# Patient Record
Sex: FEMALE | Race: OTHER | HISPANIC OR LATINO | URBAN - METROPOLITAN AREA
[De-identification: names, ages, dates, MRNs, and addresses within clinical notes are randomized per-mention and may not be internally consistent; named-entity substitution may affect disease eponyms.]

---

## 2018-03-01 ENCOUNTER — OUTPATIENT (OUTPATIENT)
Dept: OUTPATIENT SERVICES | Facility: HOSPITAL | Age: 38
LOS: 1 days | End: 2018-03-01
Payer: MEDICAID

## 2018-03-01 DIAGNOSIS — Z98.89 OTHER SPECIFIED POSTPROCEDURAL STATES: Chronic | ICD-10-CM

## 2018-03-01 PROCEDURE — G9001: CPT

## 2018-03-12 ENCOUNTER — EMERGENCY (EMERGENCY)
Facility: HOSPITAL | Age: 38
LOS: 1 days | Discharge: HOME | End: 2018-03-12
Attending: EMERGENCY MEDICINE
Payer: MEDICAID

## 2018-03-12 VITALS
RESPIRATION RATE: 18 BRPM | DIASTOLIC BLOOD PRESSURE: 83 MMHG | TEMPERATURE: 98 F | HEART RATE: 83 BPM | OXYGEN SATURATION: 97 % | SYSTOLIC BLOOD PRESSURE: 127 MMHG

## 2018-03-12 DIAGNOSIS — Z98.89 OTHER SPECIFIED POSTPROCEDURAL STATES: Chronic | ICD-10-CM

## 2018-03-12 LAB
APPEARANCE UR: CLEAR — SIGNIFICANT CHANGE UP
BACTERIA # UR AUTO: ABNORMAL /HPF
BILIRUB UR-MCNC: NEGATIVE — SIGNIFICANT CHANGE UP
COLOR SPEC: YELLOW — SIGNIFICANT CHANGE UP
DIFF PNL FLD: ABNORMAL
EPI CELLS # UR: SIGNIFICANT CHANGE UP /HPF
GLUCOSE UR QL: NEGATIVE — SIGNIFICANT CHANGE UP
HCG UR QL: NEGATIVE — SIGNIFICANT CHANGE UP
KETONES UR-MCNC: NEGATIVE — SIGNIFICANT CHANGE UP
LEUKOCYTE ESTERASE UR-ACNC: NEGATIVE — SIGNIFICANT CHANGE UP
NITRITE UR-MCNC: NEGATIVE — SIGNIFICANT CHANGE UP
PH UR: 5 — SIGNIFICANT CHANGE UP (ref 5–8)
PROT UR-MCNC: 15
RBC CASTS # UR COMP ASSIST: ABNORMAL /HPF (ref 0–2)
SP GR SPEC: 1.02 — SIGNIFICANT CHANGE UP (ref 1.01–1.02)
UROBILINOGEN FLD QL: NEGATIVE — SIGNIFICANT CHANGE UP
WBC UR QL: SIGNIFICANT CHANGE UP /HPF (ref 0–5)

## 2018-03-12 PROCEDURE — 72148 MRI LUMBAR SPINE W/O DYE: CPT | Mod: 26

## 2018-03-12 PROCEDURE — 99284 EMERGENCY DEPT VISIT MOD MDM: CPT

## 2018-03-12 PROCEDURE — 81025 URINE PREGNANCY TEST: CPT

## 2018-03-12 PROCEDURE — 96372 THER/PROPH/DIAG INJ SC/IM: CPT

## 2018-03-12 PROCEDURE — 72148 MRI LUMBAR SPINE W/O DYE: CPT

## 2018-03-12 PROCEDURE — 81001 URINALYSIS AUTO W/SCOPE: CPT

## 2018-03-12 PROCEDURE — 99284 EMERGENCY DEPT VISIT MOD MDM: CPT | Mod: 25

## 2018-03-12 RX ORDER — IBUPROFEN 200 MG
1 TABLET ORAL
Qty: 20 | Refills: 0
Start: 2018-03-12 | End: 2018-03-16

## 2018-03-12 RX ORDER — OXYCODONE AND ACETAMINOPHEN 5; 325 MG/1; MG/1
1 TABLET ORAL ONCE
Qty: 0 | Refills: 0 | Status: DISCONTINUED | OUTPATIENT
Start: 2018-03-12 | End: 2018-03-12

## 2018-03-12 RX ORDER — METHOCARBAMOL 500 MG/1
2 TABLET, FILM COATED ORAL
Qty: 24 | Refills: 0
Start: 2018-03-12 | End: 2018-03-15

## 2018-03-12 RX ORDER — KETOROLAC TROMETHAMINE 30 MG/ML
60 SYRINGE (ML) INJECTION ONCE
Qty: 0 | Refills: 0 | Status: DISCONTINUED | OUTPATIENT
Start: 2018-03-12 | End: 2018-03-12

## 2018-03-12 RX ORDER — OXYCODONE HYDROCHLORIDE 5 MG/1
1 TABLET ORAL
Qty: 9 | Refills: 0
Start: 2018-03-12 | End: 2018-03-14

## 2018-03-12 RX ADMIN — OXYCODONE AND ACETAMINOPHEN 1 TABLET(S): 5; 325 TABLET ORAL at 17:52

## 2018-03-12 RX ADMIN — Medication 60 MILLIGRAM(S): at 17:52

## 2018-03-12 NOTE — ED PROVIDER NOTE - OBJECTIVE STATEMENT
37 y/o F pt w/ a PMHx of HTN presents to the ED c/o lower back pain x days. Pain is left-sided, radiates down the L sided. Intense worsening of sx since yesterday morning. Pt denies increased exertion. Reports urinary incontinence-- states that she has not been able to get to the bathroom to urinate in time since yesterday. Pt has had multiple episodes of urinary incontinence yesterday and today. No prior hx of back pain or sciatica. Pt took Tylenol w/ no relief last night. LNMP was February 12th. Denies numbness, weakness, and any other complaints. NKDA.

## 2018-03-12 NOTE — ED ADULT NURSE NOTE - OBJECTIVE STATEMENT
pt is a 37 y/o female with c/o abdominal pain started yesterday unable to  eat nor drink food. abdomen soft non distendeD + BS noted pt is a 39 y/o female with c/o back pain  started yesterday  denies any injury  and left side radiates going down to her left sided  since yesterday back pain increases thru exertion.

## 2018-03-12 NOTE — ED PROVIDER NOTE - NEUROLOGICAL, MLM
Alert and oriented, no focal deficits, no motor or sensory deficits. NV intact. No saddle paresthesia.

## 2018-03-12 NOTE — ED ADULT NURSE NOTE - LOCATION
[facility] [address] of Child Health Examination       Student's Name  Nehalse Beck Bernarda Da Title                           Date     Signature HEALTH HISTORY          TO BE COMPLETED AND SIGNED BY PARENT/GUARDIAN AND VERIFIED BY HEALTH CARE PROVIDER    ALLERGIES  (Food, drug, insect, other)  Patient has no known allergies.  MEDICATION  (List all prescribed or taken on a regular basis.)    Current Bone/Joint problem/injury/scoliosis?    Yes   No  Parent/Guardian Signature                                          Date     PHYSICAL EXAMINATION REQUIREMENTS    Entire section below to be completed by MD/DO/APN/PA       PHYSICAL EXAMINATION REQUIREMENTS ( Eyes Yes     Screen result:   Genito-Urinary Yes  LMP   Nose Yes  Neurological Yes    Throat Yes  Musculoskeletal Yes    Mouth/Dental Yes  Spinal examination Yes    Cardiovascular/HTN Yes  Nutritional status Yes    Respiratory Yes                   Diagnos Printed by the [employer] abdomen

## 2018-03-12 NOTE — ED PROVIDER NOTE - MEDICAL DECISION MAKING DETAILS
Pt w/ lower back pain and urinary incontinence. New onset sciatica. Significant sx. Difficulty ambulating and intermittent urinary incontinence. Will get emergent MRI to r/o Cauda equina or cord compression. Reassess.

## 2018-03-12 NOTE — ED PROVIDER NOTE - PROGRESS NOTE DETAILS
NP NOTE: Pt seen by intake physician and HPI/ROS/PE/MDM reviewed. Pt seen and evaluated. Discussed plan and any resulted studies at this time. Will continue to monitor and re-evaluate.  Re-Evaluation: pt feeling slightly improved. will dc with spine fu. Nemes - MRI neg, pt feels better, will DC w Spine f/u

## 2018-03-16 DIAGNOSIS — R69 ILLNESS, UNSPECIFIED: ICD-10-CM

## 2018-03-26 ENCOUNTER — APPOINTMENT (OUTPATIENT)
Dept: ORTHOPEDIC SURGERY | Facility: CLINIC | Age: 38
End: 2018-03-26
Payer: MEDICAID

## 2018-03-26 ENCOUNTER — TRANSCRIPTION ENCOUNTER (OUTPATIENT)
Age: 38
End: 2018-03-26

## 2018-03-26 VITALS — WEIGHT: 270 LBS | HEIGHT: 65 IN | BODY MASS INDEX: 44.98 KG/M2

## 2018-03-26 DIAGNOSIS — Z87.39 PERSONAL HISTORY OF OTHER DISEASES OF THE MUSCULOSKELETAL SYSTEM AND CONNECTIVE TISSUE: ICD-10-CM

## 2018-03-26 DIAGNOSIS — M51.36 OTHER INTERVERTEBRAL DISC DEGENERATION, LUMBAR REGION: ICD-10-CM

## 2018-03-26 DIAGNOSIS — Z78.9 OTHER SPECIFIED HEALTH STATUS: ICD-10-CM

## 2018-03-26 PROCEDURE — 99203 OFFICE O/P NEW LOW 30 MIN: CPT

## 2018-03-26 RX ORDER — DICLOFENAC SODIUM 75 MG/1
75 TABLET, DELAYED RELEASE ORAL
Qty: 40 | Refills: 0 | Status: DISCONTINUED | COMMUNITY
Start: 2018-03-26 | End: 2018-03-26

## 2018-03-28 ENCOUNTER — CHART COPY (OUTPATIENT)
Age: 38
End: 2018-03-28

## 2018-11-13 ENCOUNTER — APPOINTMENT (OUTPATIENT)
Dept: INTERNAL MEDICINE | Facility: CLINIC | Age: 38
End: 2018-11-13

## 2019-09-01 ENCOUNTER — OUTPATIENT (OUTPATIENT)
Dept: OUTPATIENT SERVICES | Facility: HOSPITAL | Age: 39
LOS: 1 days | End: 2019-09-01
Payer: MEDICAID

## 2019-09-01 DIAGNOSIS — Z98.89 OTHER SPECIFIED POSTPROCEDURAL STATES: Chronic | ICD-10-CM

## 2019-09-01 PROCEDURE — G9001: CPT

## 2019-09-16 ENCOUNTER — EMERGENCY (EMERGENCY)
Facility: HOSPITAL | Age: 39
LOS: 1 days | Discharge: HOME | End: 2019-09-16
Attending: EMERGENCY MEDICINE
Payer: MEDICAID

## 2019-09-16 VITALS
RESPIRATION RATE: 16 BRPM | DIASTOLIC BLOOD PRESSURE: 86 MMHG | HEART RATE: 92 BPM | OXYGEN SATURATION: 98 % | HEIGHT: 66 IN | TEMPERATURE: 98 F | WEIGHT: 270.07 LBS | SYSTOLIC BLOOD PRESSURE: 133 MMHG

## 2019-09-16 DIAGNOSIS — Z98.89 OTHER SPECIFIED POSTPROCEDURAL STATES: Chronic | ICD-10-CM

## 2019-09-16 LAB
ALBUMIN SERPL ELPH-MCNC: 3.1 G/DL — LOW (ref 3.5–5)
ALP SERPL-CCNC: 70 U/L — SIGNIFICANT CHANGE UP (ref 40–120)
ALT FLD-CCNC: 34 U/L DA — SIGNIFICANT CHANGE UP (ref 10–60)
ANION GAP SERPL CALC-SCNC: 5 MMOL/L — SIGNIFICANT CHANGE UP (ref 5–17)
APTT BLD: 31.2 SEC — SIGNIFICANT CHANGE UP (ref 27.5–36.3)
AST SERPL-CCNC: 18 U/L — SIGNIFICANT CHANGE UP (ref 10–40)
BASOPHILS # BLD AUTO: 0.03 K/UL — SIGNIFICANT CHANGE UP (ref 0–0.2)
BASOPHILS NFR BLD AUTO: 0.3 % — SIGNIFICANT CHANGE UP (ref 0–2)
BILIRUB SERPL-MCNC: 0.3 MG/DL — SIGNIFICANT CHANGE UP (ref 0.2–1.2)
BUN SERPL-MCNC: 11 MG/DL — SIGNIFICANT CHANGE UP (ref 7–18)
CALCIUM SERPL-MCNC: 8.7 MG/DL — SIGNIFICANT CHANGE UP (ref 8.4–10.5)
CHLORIDE SERPL-SCNC: 107 MMOL/L — SIGNIFICANT CHANGE UP (ref 96–108)
CO2 SERPL-SCNC: 28 MMOL/L — SIGNIFICANT CHANGE UP (ref 22–31)
CREAT SERPL-MCNC: 0.61 MG/DL — SIGNIFICANT CHANGE UP (ref 0.5–1.3)
D DIMER BLD IA.RAPID-MCNC: 274 NG/ML DDU — HIGH
EOSINOPHIL # BLD AUTO: 0.14 K/UL — SIGNIFICANT CHANGE UP (ref 0–0.5)
EOSINOPHIL NFR BLD AUTO: 1.4 % — SIGNIFICANT CHANGE UP (ref 0–6)
GLUCOSE SERPL-MCNC: 106 MG/DL — HIGH (ref 70–99)
HCT VFR BLD CALC: 36.1 % — SIGNIFICANT CHANGE UP (ref 34.5–45)
HGB BLD-MCNC: 11.6 G/DL — SIGNIFICANT CHANGE UP (ref 11.5–15.5)
IMM GRANULOCYTES NFR BLD AUTO: 0.2 % — SIGNIFICANT CHANGE UP (ref 0–1.5)
INR BLD: 1.18 RATIO — HIGH (ref 0.88–1.16)
LYMPHOCYTES # BLD AUTO: 2.07 K/UL — SIGNIFICANT CHANGE UP (ref 1–3.3)
LYMPHOCYTES # BLD AUTO: 20.6 % — SIGNIFICANT CHANGE UP (ref 13–44)
MCHC RBC-ENTMCNC: 26.7 PG — LOW (ref 27–34)
MCHC RBC-ENTMCNC: 32.1 GM/DL — SIGNIFICANT CHANGE UP (ref 32–36)
MCV RBC AUTO: 83 FL — SIGNIFICANT CHANGE UP (ref 80–100)
MONOCYTES # BLD AUTO: 1.04 K/UL — HIGH (ref 0–0.9)
MONOCYTES NFR BLD AUTO: 10.3 % — SIGNIFICANT CHANGE UP (ref 2–14)
NEUTROPHILS # BLD AUTO: 6.76 K/UL — SIGNIFICANT CHANGE UP (ref 1.8–7.4)
NEUTROPHILS NFR BLD AUTO: 67.2 % — SIGNIFICANT CHANGE UP (ref 43–77)
NRBC # BLD: 0 /100 WBCS — SIGNIFICANT CHANGE UP (ref 0–0)
PLATELET # BLD AUTO: 184 K/UL — SIGNIFICANT CHANGE UP (ref 150–400)
POTASSIUM SERPL-MCNC: 3.4 MMOL/L — LOW (ref 3.5–5.3)
POTASSIUM SERPL-SCNC: 3.4 MMOL/L — LOW (ref 3.5–5.3)
PROT SERPL-MCNC: 6.7 G/DL — SIGNIFICANT CHANGE UP (ref 6–8.3)
PROTHROM AB SERPL-ACNC: 13.2 SEC — HIGH (ref 10–12.9)
RBC # BLD: 4.35 M/UL — SIGNIFICANT CHANGE UP (ref 3.8–5.2)
RBC # FLD: 13.4 % — SIGNIFICANT CHANGE UP (ref 10.3–14.5)
SODIUM SERPL-SCNC: 140 MMOL/L — SIGNIFICANT CHANGE UP (ref 135–145)
T4 FREE+ TSH PNL SERPL: 5.32 UU/ML — HIGH (ref 0.34–4.82)
TROPONIN I SERPL-MCNC: <0.015 NG/ML — SIGNIFICANT CHANGE UP (ref 0–0.04)
TROPONIN I SERPL-MCNC: <0.015 NG/ML — SIGNIFICANT CHANGE UP (ref 0–0.04)
WBC # BLD: 10.06 K/UL — SIGNIFICANT CHANGE UP (ref 3.8–10.5)
WBC # FLD AUTO: 10.06 K/UL — SIGNIFICANT CHANGE UP (ref 3.8–10.5)

## 2019-09-16 PROCEDURE — 71275 CT ANGIOGRAPHY CHEST: CPT

## 2019-09-16 PROCEDURE — 99284 EMERGENCY DEPT VISIT MOD MDM: CPT | Mod: 25

## 2019-09-16 PROCEDURE — 85610 PROTHROMBIN TIME: CPT

## 2019-09-16 PROCEDURE — 85027 COMPLETE CBC AUTOMATED: CPT

## 2019-09-16 PROCEDURE — 71275 CT ANGIOGRAPHY CHEST: CPT | Mod: 26

## 2019-09-16 PROCEDURE — 84484 ASSAY OF TROPONIN QUANT: CPT

## 2019-09-16 PROCEDURE — 85379 FIBRIN DEGRADATION QUANT: CPT

## 2019-09-16 PROCEDURE — 80053 COMPREHEN METABOLIC PANEL: CPT

## 2019-09-16 PROCEDURE — 36415 COLL VENOUS BLD VENIPUNCTURE: CPT

## 2019-09-16 PROCEDURE — 93005 ELECTROCARDIOGRAM TRACING: CPT

## 2019-09-16 PROCEDURE — 84443 ASSAY THYROID STIM HORMONE: CPT

## 2019-09-16 PROCEDURE — 85730 THROMBOPLASTIN TIME PARTIAL: CPT

## 2019-09-16 PROCEDURE — 84702 CHORIONIC GONADOTROPIN TEST: CPT

## 2019-09-16 PROCEDURE — 99285 EMERGENCY DEPT VISIT HI MDM: CPT

## 2019-09-16 NOTE — ED PROVIDER NOTE - PATIENT PORTAL LINK FT
You can access the FollowMyHealth Patient Portal offered by [facility] by registering at the following website: [email or web address]. By joining [employer]’s FollowMyHealth portal, you will also be able to view your health information using other applications (apps) compatible with our system.

## 2019-09-16 NOTE — ED PROVIDER NOTE - NSFOLLOWUPINSTRUCTIONS_ED_ALL_ED_FT
Return to ER immediately if you have chest pain, if you have pain with radiation to arms, back or neck, if you feel short of breath, feel weak, feel fast or pounding heart beating, if you have any fever or vomiting.  If you need assistance with follow up appointments our Care Coordinator can be reached at [phone]. In addition the [facility] is located at [address] [place], [place] [address], tel: [phone].

## 2019-09-16 NOTE — ED PROVIDER NOTE - CLINICAL SUMMARY MEDICAL DECISION MAKING FREE TEXT BOX
EKG with S1Q3T3, IRBBB pattern, will order CTA to r/o PE. Pt in no distress. EKG with S1Q3T3, IRBBB pattern, will order CTA to r/o PE. Pt in no distress.  6:54a- Pt feels better, asymptomatic. trop x 2 neg, PE neg on CTA chest. Pt is well appearing walking with normal gait, stable for discharge and follow up with medical doctor. Pt educated on care and need for follow up. Discussed anticipatory guidance and return precautions. Questions answered. I had a detailed discussion with the patient and/or guardian regarding the historical points, exam findings, and any diagnostic results supporting the discharge diagnosis.

## 2019-09-16 NOTE — ED ADULT TRIAGE NOTE - CHIEF COMPLAINT QUOTE
" I feel weird, I am having palpitations and feels dizzy a little bit like 30 minutes now. I have tooth infection my left side three days and I am on antibiotics "

## 2019-09-16 NOTE — ED PROVIDER NOTE - NSFOLLOWUPCLINICS_GEN_ALL_ED_FT
[facility]  [facility]  [address] [facility] - [address]nd [place] [address]  Phone: [phone]  Fax: [phone]  Follow Up Time:

## 2019-09-16 NOTE — ED ADULT NURSE NOTE - OBJECTIVE STATEMENT
Pt came in ambulatory reporting palpitations with dizziness X 30mins. Pt denied any chest pain, SOB, headache, N/V/D, visual changes. AxO3. All safety precautions in place. Pt came in ambulatory reporting palpitations with dizziness X 30mins. Pt placed on bedside cardiac monitor, Sinus Rhythm. Pt denied any chest pain, SOB, headache, N/V/D, visual changes. AxO3. All safety precautions in place.

## 2019-09-16 NOTE — ED PROVIDER NOTE - NORMAL, MLM
Norco    5-325 MG   Last Written Prescription Date:  4/23/18  Last Fill Quantity: 18,   # refills: 0  Last Office Visit: 3/29/18  Future Office visit:       Routing refill request to provider for review/approval because:  Drug not on the [facility], [facility] or [facility] refill protocol or controlled substance  Tramadol       Last Written Prescription Date:  4/23/18  Last Fill Quantity: 90,   # refills: 0  Last Office Visit: 3/29/18  Future Office visit:       Routing refill request to provider for review/approval because:  Drug not on the [facility], [facility] or [facility] refill protocol or controlled substance     mary all pertinent systems normal

## 2019-09-19 DIAGNOSIS — Z71.89 OTHER SPECIFIED COUNSELING: ICD-10-CM

## 2019-11-24 ENCOUNTER — TRANSCRIPTION ENCOUNTER (OUTPATIENT)
Age: 39
End: 2019-11-24

## 2019-12-13 ENCOUNTER — TRANSCRIPTION ENCOUNTER (OUTPATIENT)
Age: 39
End: 2019-12-13

## 2020-06-10 ENCOUNTER — NON-APPOINTMENT (OUTPATIENT)
Age: 40
End: 2020-06-10

## 2020-06-10 ENCOUNTER — APPOINTMENT (OUTPATIENT)
Dept: INTERNAL MEDICINE | Facility: CLINIC | Age: 40
End: 2020-06-10
Payer: MEDICAID

## 2020-06-10 VITALS
HEART RATE: 97 BPM | WEIGHT: 252 LBS | SYSTOLIC BLOOD PRESSURE: 121 MMHG | OXYGEN SATURATION: 96 % | RESPIRATION RATE: 16 BRPM | DIASTOLIC BLOOD PRESSURE: 83 MMHG | TEMPERATURE: 98.3 F | HEIGHT: 65 IN | BODY MASS INDEX: 41.99 KG/M2

## 2020-06-10 DIAGNOSIS — L65.9 NONSCARRING HAIR LOSS, UNSPECIFIED: ICD-10-CM

## 2020-06-10 DIAGNOSIS — Z20.828 CONTACT WITH AND (SUSPECTED) EXPOSURE TO OTHER VIRAL COMMUNICABLE DISEASES: ICD-10-CM

## 2020-06-10 DIAGNOSIS — R41.3 OTHER AMNESIA: ICD-10-CM

## 2020-06-10 PROCEDURE — 93000 ELECTROCARDIOGRAM COMPLETE: CPT

## 2020-06-10 PROCEDURE — 99213 OFFICE O/P EST LOW 20 MIN: CPT

## 2020-06-10 PROCEDURE — 99385 PREV VISIT NEW AGE 18-39: CPT

## 2020-06-10 RX ORDER — CYCLOBENZAPRINE HYDROCHLORIDE 10 MG/1
10 TABLET, FILM COATED ORAL 3 TIMES DAILY
Qty: 60 | Refills: 0 | Status: DISCONTINUED | COMMUNITY
Start: 2018-03-26 | End: 2020-06-10

## 2020-06-10 RX ORDER — VITAMIN E ACID SUCCINATE 268 MG
TABLET ORAL
Refills: 0 | Status: DISCONTINUED | COMMUNITY
End: 2020-06-10

## 2020-06-10 RX ORDER — IBUPROFEN 600 MG/1
600 TABLET, FILM COATED ORAL 3 TIMES DAILY
Qty: 60 | Refills: 0 | Status: DISCONTINUED | COMMUNITY
Start: 2018-03-26 | End: 2020-06-10

## 2020-06-10 NOTE — REVIEW OF SYSTEMS
[Patient Intake Form Reviewed] : Patient intake form was reviewed [Hair Changes] : hair changes [Memory Loss] : memory loss [Negative] : Heme/Lymph

## 2020-06-10 NOTE — HISTORY OF PRESENT ILLNESS
[de-identified] : PT COMES FOR INITIAL CPE \par LAST ONE YEARS AGO\par CC OF HAIR LOSS  AND DECREASED VOLUME AND THICKNESS FOR 2 WEEKS\par ALSO CC OF POOR MEMORY FOR SEVERAL YEARS,NEEDS TO WRITE EVERYTHING DOWN ;NEVER HAD TROUBLE WHILE IN SCHOOL\par HAD GAINED WT WITH BOTH PREGNANCIES,LAST ONE 5 Y AGO\par THINKS HE HAD COVID-19 3/2020,SX LASTED 3-4 WEEKS,NEVER TESTED

## 2020-06-10 NOTE — ASSESSMENT
[FreeTextEntry1] : CPE OF 40 Y OLD FEM = LABS AND EKG \par HAIR LOSS= LABS AND DERM REFERRAL\par OBESITY= AND SEDENTARY= DISCUSSED\par MEMORY LOSS FOR YEARS= TO SEE NEUROLOGY \par RTO AS PER RESULTS

## 2020-06-10 NOTE — PHYSICAL EXAM
[No Acute Distress] : no acute distress [Well Nourished] : well nourished [Well Developed] : well developed [Well-Appearing] : well-appearing [Normal Sclera/Conjunctiva] : normal sclera/conjunctiva [EOMI] : extraocular movements intact [PERRL] : pupils equal round and reactive to light [No JVD] : no jugular venous distention [No Lymphadenopathy] : no lymphadenopathy [Thyroid Normal, No Nodules] : the thyroid was normal and there were no nodules present [Supple] : supple [No Respiratory Distress] : no respiratory distress  [No Accessory Muscle Use] : no accessory muscle use [Clear to Auscultation] : lungs were clear to auscultation bilaterally [Normal Rate] : normal rate  [Regular Rhythm] : with a regular rhythm [Normal S1, S2] : normal S1 and S2 [No Murmur] : no murmur heard [No Carotid Bruits] : no carotid bruits [No Varicosities] : no varicosities [Pedal Pulses Present] : the pedal pulses are present [No Abdominal Bruit] : a ~M bruit was not heard ~T in the abdomen [No Edema] : there was no peripheral edema [No Palpable Aorta] : no palpable aorta [No Extremity Clubbing/Cyanosis] : no extremity clubbing/cyanosis [Soft] : abdomen soft [Non Tender] : non-tender [Non-distended] : non-distended [No Masses] : no abdominal mass palpated [No HSM] : no HSM [Normal Bowel Sounds] : normal bowel sounds [Normal Posterior Cervical Nodes] : no posterior cervical lymphadenopathy [Normal Anterior Cervical Nodes] : no anterior cervical lymphadenopathy [No CVA Tenderness] : no CVA  tenderness [No Spinal Tenderness] : no spinal tenderness [No Joint Swelling] : no joint swelling [Grossly Normal Strength/Tone] : grossly normal strength/tone [No Rash] : no rash [Coordination Grossly Intact] : coordination grossly intact [No Focal Deficits] : no focal deficits [Deep Tendon Reflexes (DTR)] : deep tendon reflexes were 2+ and symmetric [Normal Gait] : normal gait [Normal Affect] : the affect was normal [Normal Insight/Judgement] : insight and judgment were intact

## 2020-06-11 ENCOUNTER — TRANSCRIPTION ENCOUNTER (OUTPATIENT)
Age: 40
End: 2020-06-11

## 2020-06-11 LAB
ALBUMIN SERPL ELPH-MCNC: 4.6 G/DL
ALP BLD-CCNC: 69 U/L
ALT SERPL-CCNC: 23 U/L
ANION GAP SERPL CALC-SCNC: 22 MMOL/L
APPEARANCE: CLEAR
AST SERPL-CCNC: 20 U/L
BASOPHILS # BLD AUTO: 0.04 K/UL
BASOPHILS NFR BLD AUTO: 0.5 %
BILIRUB SERPL-MCNC: 0.4 MG/DL
BILIRUBIN URINE: NEGATIVE
BLOOD URINE: NEGATIVE
BUN SERPL-MCNC: 13 MG/DL
CALCIUM SERPL-MCNC: 10 MG/DL
CHLORIDE SERPL-SCNC: 103 MMOL/L
CHOLEST SERPL-MCNC: 130 MG/DL
CHOLEST/HDLC SERPL: 4.1 RATIO
CO2 SERPL-SCNC: 18 MMOL/L
COLOR: YELLOW
CREAT SERPL-MCNC: 0.64 MG/DL
EOSINOPHIL # BLD AUTO: 0.12 K/UL
EOSINOPHIL NFR BLD AUTO: 1.5 %
GLUCOSE QUALITATIVE U: NEGATIVE
GLUCOSE SERPL-MCNC: 97 MG/DL
HCT VFR BLD CALC: 45.2 %
HDLC SERPL-MCNC: 32 MG/DL
HGB BLD-MCNC: 14.2 G/DL
IMM GRANULOCYTES NFR BLD AUTO: 0.3 %
KETONES URINE: NEGATIVE
LDLC SERPL CALC-MCNC: 81 MG/DL
LEUKOCYTE ESTERASE URINE: NEGATIVE
LYMPHOCYTES # BLD AUTO: 2.05 K/UL
LYMPHOCYTES NFR BLD AUTO: 25.8 %
MAN DIFF?: NORMAL
MCHC RBC-ENTMCNC: 26.5 PG
MCHC RBC-ENTMCNC: 31.4 GM/DL
MCV RBC AUTO: 84.5 FL
MONOCYTES # BLD AUTO: 0.56 K/UL
MONOCYTES NFR BLD AUTO: 7 %
NEUTROPHILS # BLD AUTO: 5.17 K/UL
NEUTROPHILS NFR BLD AUTO: 64.9 %
NITRITE URINE: NEGATIVE
PH URINE: 5.5
PLATELET # BLD AUTO: 212 K/UL
POTASSIUM SERPL-SCNC: 4.5 MMOL/L
PROT SERPL-MCNC: 7.2 G/DL
PROTEIN URINE: NEGATIVE
RBC # BLD: 5.35 M/UL
RBC # FLD: 14.2 %
SODIUM SERPL-SCNC: 142 MMOL/L
SPECIFIC GRAVITY URINE: 1.03
TRIGL SERPL-MCNC: 88 MG/DL
UROBILINOGEN URINE: NORMAL
VIT B12 SERPL-MCNC: 390 PG/ML
WBC # FLD AUTO: 7.96 K/UL

## 2020-06-12 LAB
25(OH)D3 SERPL-MCNC: 27.5 NG/ML
SARS-COV-2 IGG SERPL IA-ACNC: 5.09 INDEX
SARS-COV-2 IGG SERPL QL IA: POSITIVE
TSH SERPL-ACNC: 2.03 UIU/ML

## 2020-06-14 LAB
TESTOST BND SERPL-MCNC: 2.4 PG/ML
TESTOST SERPL-MCNC: 20.1 NG/DL
VIT B1 SERPL-MCNC: 167.3 NMOL/L

## 2021-01-25 ENCOUNTER — APPOINTMENT (OUTPATIENT)
Dept: INTERNAL MEDICINE | Facility: CLINIC | Age: 41
End: 2021-01-25

## 2021-04-06 DIAGNOSIS — H92.09 OTALGIA, UNSPECIFIED EAR: ICD-10-CM

## 2021-10-04 ENCOUNTER — APPOINTMENT (OUTPATIENT)
Dept: INTERNAL MEDICINE | Facility: CLINIC | Age: 41
End: 2021-10-04
Payer: MEDICAID

## 2021-10-04 VITALS
SYSTOLIC BLOOD PRESSURE: 112 MMHG | HEART RATE: 86 BPM | OXYGEN SATURATION: 98 % | HEIGHT: 65 IN | TEMPERATURE: 97.6 F | WEIGHT: 153 LBS | DIASTOLIC BLOOD PRESSURE: 75 MMHG | RESPIRATION RATE: 14 BRPM | BODY MASS INDEX: 25.49 KG/M2

## 2021-10-04 DIAGNOSIS — K21.9 GASTRO-ESOPHAGEAL REFLUX DISEASE W/OUT ESOPHAGITIS: ICD-10-CM

## 2021-10-04 DIAGNOSIS — R32 UNSPECIFIED URINARY INCONTINENCE: ICD-10-CM

## 2021-10-04 DIAGNOSIS — R31.9 HEMATURIA, UNSPECIFIED: ICD-10-CM

## 2021-10-04 PROCEDURE — 99213 OFFICE O/P EST LOW 20 MIN: CPT | Mod: 25

## 2021-10-04 PROCEDURE — 99395 PREV VISIT EST AGE 18-39: CPT | Mod: 25

## 2021-10-04 NOTE — ASSESSMENT
[FreeTextEntry1] : CPE OF 41 Y OLD FEM = LABS\par URINE INCONTINENCE= TO SEE  \par BACK PAIN = F/U NEURO \par RTO AS PER RESULTS \par RECOMM COVID-19 VACCINE

## 2021-10-04 NOTE — REVIEW OF SYSTEMS
[Heartburn] : heartburn [Incontinence] : incontinence [Back Pain] : back pain [Negative] : Heme/Lymph

## 2021-10-04 NOTE — PHYSICAL EXAM

## 2021-10-04 NOTE — HISTORY OF PRESENT ILLNESS
[de-identified] : SEEN BY NEURO DR MOURAFOR BACK PAIN\par HESITANT TO HAVE COVID-19 VACCINE ;HAD COVID2/2020\par CC OF URINE INCONTINENCE

## 2021-10-05 LAB
25(OH)D3 SERPL-MCNC: 54.5 NG/ML
ALBUMIN SERPL ELPH-MCNC: 4 G/DL
ALP BLD-CCNC: 65 U/L
ALT SERPL-CCNC: 18 U/L
ANION GAP SERPL CALC-SCNC: 11 MMOL/L
APPEARANCE: CLEAR
AST SERPL-CCNC: 19 U/L
BACTERIA: NEGATIVE
BILIRUB SERPL-MCNC: 0.2 MG/DL
BILIRUBIN URINE: NEGATIVE
BLOOD URINE: ABNORMAL
BUN SERPL-MCNC: 16 MG/DL
CALCIUM SERPL-MCNC: 9.4 MG/DL
CHLORIDE SERPL-SCNC: 105 MMOL/L
CHOLEST SERPL-MCNC: 110 MG/DL
CO2 SERPL-SCNC: 26 MMOL/L
COLOR: YELLOW
COVID-19 NUCLEOCAPSID  GAM ANTIBODY INTERPRETATION: POSITIVE
CREAT SERPL-MCNC: 0.58 MG/DL
GLUCOSE QUALITATIVE U: NEGATIVE
GLUCOSE SERPL-MCNC: 86 MG/DL
HDLC SERPL-MCNC: 31 MG/DL
HYALINE CASTS: 0 /LPF
KETONES URINE: NEGATIVE
LDLC SERPL CALC-MCNC: 62 MG/DL
LEUKOCYTE ESTERASE URINE: NEGATIVE
MICROSCOPIC-UA: NORMAL
NITRITE URINE: NEGATIVE
NONHDLC SERPL-MCNC: 79 MG/DL
PH URINE: 6.5
POTASSIUM SERPL-SCNC: 4.7 MMOL/L
PROT SERPL-MCNC: 6.5 G/DL
PROTEIN URINE: NORMAL
RED BLOOD CELLS URINE: 73 /HPF
SARS-COV-2 AB SERPL QL IA: 47 INDEX
SODIUM SERPL-SCNC: 141 MMOL/L
SPECIFIC GRAVITY URINE: 1.03
SQUAMOUS EPITHELIAL CELLS: 5 /HPF
TRIGL SERPL-MCNC: 84 MG/DL
TSH SERPL-ACNC: 3.16 UIU/ML
UROBILINOGEN URINE: NORMAL
WHITE BLOOD CELLS URINE: 2 /HPF

## 2021-10-22 PROBLEM — R31.9 BLOOD IN URINE: Status: ACTIVE | Noted: 2021-10-22

## 2021-10-22 LAB
BASOPHILS # BLD AUTO: 0.04 K/UL
BASOPHILS NFR BLD AUTO: 0.5 %
EOSINOPHIL # BLD AUTO: 0.13 K/UL
EOSINOPHIL NFR BLD AUTO: 1.7 %
HCT VFR BLD CALC: 39.7 %
HGB BLD-MCNC: 12.8 G/DL
IMM GRANULOCYTES NFR BLD AUTO: 0.1 %
LYMPHOCYTES # BLD AUTO: 1.69 K/UL
LYMPHOCYTES NFR BLD AUTO: 22.6 %
MAN DIFF?: NORMAL
MCHC RBC-ENTMCNC: 28.1 PG
MCHC RBC-ENTMCNC: 32.2 GM/DL
MCV RBC AUTO: 87.3 FL
MONOCYTES # BLD AUTO: 0.58 K/UL
MONOCYTES NFR BLD AUTO: 7.7 %
NEUTROPHILS # BLD AUTO: 5.04 K/UL
NEUTROPHILS NFR BLD AUTO: 67.4 %
PLATELET # BLD AUTO: 175 K/UL
RBC # BLD: 4.55 M/UL
RBC # FLD: 13.3 %
WBC # FLD AUTO: 7.49 K/UL

## 2021-10-25 DIAGNOSIS — M79.643 PAIN IN UNSPECIFIED HAND: ICD-10-CM

## 2022-04-13 ENCOUNTER — EMERGENCY (EMERGENCY)
Facility: HOSPITAL | Age: 42
LOS: 1 days | Discharge: HOME | End: 2022-04-13
Attending: EMERGENCY MEDICINE
Payer: MEDICAID

## 2022-04-13 VITALS
SYSTOLIC BLOOD PRESSURE: 123 MMHG | TEMPERATURE: 98 F | HEIGHT: 66 IN | WEIGHT: 223.99 LBS | HEART RATE: 67 BPM | OXYGEN SATURATION: 95 % | DIASTOLIC BLOOD PRESSURE: 63 MMHG | RESPIRATION RATE: 16 BRPM

## 2022-04-13 DIAGNOSIS — Z98.89 OTHER SPECIFIED POSTPROCEDURAL STATES: Chronic | ICD-10-CM

## 2022-04-13 PROCEDURE — 99284 EMERGENCY DEPT VISIT MOD MDM: CPT

## 2022-04-13 PROCEDURE — 99283 EMERGENCY DEPT VISIT LOW MDM: CPT

## 2022-04-13 RX ORDER — IBUPROFEN 200 MG
600 TABLET ORAL ONCE
Refills: 0 | Status: COMPLETED | OUTPATIENT
Start: 2022-04-13 | End: 2022-04-13

## 2022-04-13 RX ORDER — OXYCODONE AND ACETAMINOPHEN 5; 325 MG/1; MG/1
1 TABLET ORAL
Qty: 10 | Refills: 0
Start: 2022-04-13 | End: 2022-04-17

## 2022-04-13 RX ORDER — IBUPROFEN 200 MG
1 TABLET ORAL
Qty: 21 | Refills: 0
Start: 2022-04-13 | End: 2022-04-19

## 2022-04-13 RX ADMIN — Medication 600 MILLIGRAM(S): at 09:43

## 2022-04-13 NOTE — ED PROVIDER NOTE - CLINICAL SUMMARY MEDICAL DECISION MAKING FREE TEXT BOX
past hx/o alcohol abuse
Patient with right-sided sciatica, no evidence of acute spinal cord compression. WIll give pain medicine and refer for MRI and spine center.

## 2022-04-13 NOTE — ED PROVIDER NOTE - PHYSICAL EXAMINATION
PHYSICAL EXAM:  GENERAL: NAD, speaks in full sentences, no signs of respiratory distress  HEAD:  Atraumatic, Normocephalic  EYES: EOMI, PERRLA, conjunctiva and sclera clear  NECK: Supple, No JVD  CHEST/LUNG: Clear to auscultation bilaterally; No wheeze; No crackles; No accessory muscles used  HEART: Regular rate and rhythm; No murmurs;   ABDOMEN: Soft, Nontender, Nondistended; Bowel sounds present; No guarding  EXTREMITIES:  2+ Peripheral Pulses, No cyanosis or edema  PSYCH: AAOx3  NEUROLOGY: non-focal, Positive Straight leg test   SKIN: No rashes or lesions

## 2022-04-13 NOTE — ED PROVIDER NOTE - PLAN OF CARE
Patient has classic signs of Sciatica due to herniated disc. Will give her 600mg of Ibuprofen now and send prescription for ibuprofen and percocet. Will give referral for MRI outpatient

## 2022-04-13 NOTE — ED ADULT NURSE NOTE - NSFALLRSKASSESSDT_ED_ALL_ED
----- Message from Yoan Hughes MD sent at 1/31/2018 11:17 AM EST -----  Contact: Carmelo   Yes, please cut to 5 mg daily. Thanks (and sorry about that).  ----- Message -----     From: Patricia Danielle Snellen, CMA     Sent: 1/31/2018  10:51 AM       To: Yoan Hughes MD        ----- Message -----     From: Diane Montez     Carmelo back   [phone]     13-Apr-2022 09:47

## 2022-04-13 NOTE — ED PROVIDER NOTE - CARE PLAN
Assessment and plan of treatment:	Patient has classic signs of Sciatica due to herniated disc. Will give her 600mg of Ibuprofen now and send prescription for ibuprofen and percocet. Will give referral for MRI outpatient   1 Principal Discharge DX:	Right sided sciatica  Assessment and plan of treatment:	Patient has classic signs of Sciatica due to herniated disc. Will give her 600mg of Ibuprofen now and send prescription for ibuprofen and percocet. Will give referral for MRI outpatient

## 2022-04-13 NOTE — ED PROVIDER NOTE - ATTENDING CONTRIBUTION TO CARE
I performed a face to face bedside interview with this patient regarding history of present illness, review of symptoms and past medical, social and family history.  I completed an independent physical examination.  I have independently reviewed any laboratory or radiologic studies. I have reviewed the resident's documentation and discussed the patient’s plan of care and disposition with the resident. I have documented any discrepancies between the resident's evaluation and my own.     Patient reports 2 days of right low back pain radiating to right buttocks and right leg down to foot. No  .......    MDM: Patient with right-sided sciatica, no evidence of acute spinal cord compression. WIll give pain medicine and refer for MRI and spine center.

## 2022-04-13 NOTE — ED PROVIDER NOTE - NSFOLLOWUPINSTRUCTIONS_ED_ALL_ED_FT
Sciatica    WHAT YOU NEED TO KNOW:    Sciatica is a condition that causes pain along your sciatic nerve. The sciatic nerve runs from your spine through both sides of your buttocks. It then runs down the back of your thigh, into your lower leg and foot. Your sciatic nerve may be compressed, inflamed, irritated, or stretched.    DISCHARGE INSTRUCTIONS:    Medicines:   •NSAIDs: These medicines decrease swelling and pain. NSAIDs are available without a doctor's order. Ask your healthcare provider which medicine is right for you. Ask how much to take and when to take it. Take as directed. NSAIDs can cause stomach bleeding or kidney problems if not taken correctly.      •Acetaminophen: This medicine decreases pain. Acetaminophen is available without a doctor's order. Ask how much to take and when to take it. Follow directions. Acetaminophen can cause liver damage if not taken correctly.      •Muscle relaxers help decrease pain and muscle spasms.      •Take your medicine as directed. Contact your healthcare provider if you think your medicine is not helping or if you have side effects. Tell him of her if you are allergic to any medicine. Keep a list of the medicines, vitamins, and herbs you take. Include the amounts, and when and why you take them. Bring the list or the pill bottles to follow-up visits. Carry your medicine list with you in case of an emergency.      Follow up with your doctor as directed: Write down your questions so you remember to ask them during your visits.     Manage your symptoms:   •Activity: Decrease your activity. Do not lift heavy objects or twist your back for at least 6 weeks. Slowly return to your usual activity.      •Ice: Ice helps decrease swelling and pain. Ice may also help prevent tissue damage. Use an ice pack, or put crushed ice in a plastic bag. Cover it with a towel and place it on your low back or leg for 15 to 20 minutes every hour or as directed.      •Heat: Heat helps decrease pain and muscle spasms. Apply heat on the area for 20 to 30 minutes every 2 hours for as many days as directed.       •Physical therapy: You may need to see physical therapist to teach you exercises to help improve movement and strength, and to decrease pain. An occupational therapist teaches you skills to help with your daily activities.       •Use assistive devices if directed: You may need to wear back support, such as a back brace. You may need crutches, a cane, or a walker to decrease stress on your lower back and leg muscles. Ask your healthcare provider for more information about assistive devices and how to use them correctly.      Self-care:   •Avoid pressure on your back and legs: Do not lift heavy objects, or stand or sit for long periods of time.      •Lift objects safely: Keep your back straight and bend your knees when you  an object. Do not bend or twist your back when you lift.      •Maintain a healthy weight: Ask your healthcare provider how much you should weigh. Ask him to help you create a weight loss plan if you are overweight.       •Exercise: Ask your healthcare provider about the best stretching, warmup, and exercise plan for you.       Contact your healthcare provider if:   •You have pain in your lower back at night or when resting.      •You have pain in your lower back with numbness below the knee.      •You have weakness in one leg only.      •You have questions or concerns about your condition or care.      Return to the emergency department if:   •You have trouble holding back your urine or bowel movements.      •You have weakness in both legs.      •You have numbness in your groin or buttocks.         © Copyright [employer] 2022           back to top                          © Copyright [employer] 2022

## 2022-04-13 NOTE — ED PROVIDER NOTE - PATIENT PORTAL LINK FT
You can access the FollowMyHealth Patient Portal offered by [facility] by registering at the following website: [email or web address]. By joining [employer]’s FollowMyHealth portal, you will also be able to view your health information using other applications (apps) compatible with our system.

## 2022-04-13 NOTE — ED ADULT TRIAGE NOTE - HISTORY OF COVID-19 VACCINATION
[facility]    [facility] [address]    Phone:  [phone]    Fax:  [phone]       Thank You for choosing us for your health care visit. We are glad to serve you and happy to provide you with this summary of your visit. Please help us to ensure we have accurate records. If you find anything that needs to be changed, please let our staff know as soon as possible.          Your Demographic Information     Patient Name Sex Monica Saenz Female 1962       Ethnic Group Patient Race    Not of  or  Origin White      Your Visit Details     Date & Time Provider Department    2017 10:00 AM Idalia Jose LCSW [facility]      Your Upcoming Appointment*(Max 10)     2017  9:00 AM CST   Follow-Up Therapy with Sagrario May, ANNA MARIE   [facility] ([facility])    [address] [facility] [place] [address]   [phone]            2017 10:30 AM CST   New Patient Visit with Carol Fong RN   [facility] ([facility])    [address] [place], [place] [address] [address] [place] [address]   [phone]            2017 10:15 AM CST   Routine Eye Exam with Pepe Curry OD   [facility] ([facility])    [address] [place], [place] [address] [address] [place] [address]   [phone]            2017  9:00 AM CST   Lab Visit with [facility]   [facility] ([facility])    [facility] [address] [place] [address]   [phone]            2017  9:00 AM CST   Follow-Up Therapy with PAULA Coyne   [facility] ([facility])    [address] [facility]  [address]   [phone]            Tuesday February 21, 2017  9:00 AM CST   Follow-Up Therapy with PAULA Coyne   [facility] ([facility])    [address] [facility] [address]   [phone]            Tuesday February 21, 2017 10:30 AM CST   New Patient Visit with Chelsey Griffith RD   [facility] ([facility])    [address] [place], [place] [address] [facility] [address] [place] [address]   [phone]            Monday February 27, 2017 10:00 AM CST   Behavioral Health Follow-Up with Idalia Jose LCSW   [facility] ([facility])    [address] [place] [address] [address] [place] [address]   [phone]            Thursday March 02, 2017  2:15 PM CST   Post-Op Visit with Joe Cummins DO   [facility] ([facility])    [address] [place] [address] [address] [place] [address]   [phone]            Tuesday May 30, 2017 10:15 AM CDT   Follow-up Visit with Sajan Olivo MD   [facility] ([facility])    [address] [place] [address] [address] [place] [address]   [phone]              Your Vitals Were     LMP Smoking Status                01/21/2015 Never Smoker          Medications Prescribed or Re-Ordered Today     None      Your Current Medications Are        Disp Refills Start End    atenolol (TENORMIN) 50 MG tablet 90 tablet 1 1/23/2017     Sig - Route: Take 1 tablet by mouth daily. - Oral    Class: Eprescribe    insulin glargine (LANTUS SOLOSTAR) 100 UNIT/ML injection 15 mL 1 1/13/2017     Sig - Route: Inject 60 Units into the skin nightly. - Subcutaneous    Class: Script Not Printed    Notes to Pharmacy: Dose adjustment (increase) 1/13/17- med not re-ordered.    glipiZIDE (GLUCOTROL) 5 MG tablet 90 tablet 5 1/13/2017     Sig - Route: Take 1 tablet by mouth 3 times daily  (with meals). - Oral    Class: Eprescribe    Vitamin D, Ergocalciferol, 36248 UNITS capsule 12 capsule 0 2017 3/28/2017    Sig - Route: Take 1 capsule by mouth once a week for 12 doses. (=12 weeks) - Oral    Class: Eprescribe    Cosign for Ordering: Accepted by Shaila Carlos MD on 2017 12:01 PM    Cholecalciferol (VITAMIN D) 2000 UNITS capsule 30 capsule  2017     Sig - Route: Take 1 capsule by mouth daily. OTC- To begin once high dose Vitamin D is completed. - Oral    Class: OTC    Cosign for Ordering: Accepted by Shaila Carlos MD on 2017 12:01 PM    levothyroxine (SYNTHROID, LEVOTHROID) 175 MCG tablet 90 tablet 0 2017     Sig - Route: Take 1 tablet by mouth daily. - Oral    Class: Eprescribe    hydrochlorothiazide (HYDRODIURIL) 25 MG tablet 90 tablet 0 2016     Sig - Route: Take 1 tablet by mouth daily. - Oral    Class: Eprescribe    metformin (GLUCOPHAGE-XR) 750 MG 24 hr tablet 90 tablet 1 2016     Si po Q AM and 1 po with supper    Class: Eprescribe    pravastatin (PRAVACHOL) 80 MG tablet 30 tablet 2 2016     Sig - Route: Take 1 tablet by mouth nightly. - Oral    Class: Eprescribe    lisinopril (ZESTRIL) 10 MG tablet 30 tablet 2 10/23/2015     Sig - Route: Take 1 tablet by mouth daily. - Oral    Class: Eprescribe    Blood Glucose Monitoring Suppl (RELION PRIME MONITOR) Device        Sig - Route: 4 times daily. - Does not apply    Class: Historical Med    Glucose Blood (RELION PRIME TEST) strip        Sig - Route: by Other route 4 times daily. - Other    Class: Historical Med    Lancets (FREESTYLE) Misc 100 each 12 2015     Sig: BID  Dx 250.00    Class: Eprescribe    FLUoxetine (PROZAC) 20 MG capsule 90 capsule 3 2014     Sig - Route: Take 1 capsule by mouth daily. - Oral    Class: Eprescribe    Insulin Pen Needle (FIFTY50 PEN NEEDLES) 31G X 5 MM MISC 50 each 11 2013     Sig: To use daily with Lantus insulin    Class: Eprescribe    Notes to Pharmacy: In place of  31g 6mm that were sent previously      Allergies     Invokana [Canagliflozin] RASH      Immunizations History as of 2/2/2017     Name Date    INFLUENZA QUADRIVALENT 10/3/2016, 12/29/2014    Influenza 1/16/2013    Td:Adult type tetanus/diphtheria 6/22/2006    Tdap 10/3/2016      Problem List as of 2/2/2017     Hypermetropia    Astigmatism, unspecified    Presbyopia    Depression    Acquired hypothyroidism    Benign essential HTN    Hyperlipidemia LDL goal <100    Obesity, Class III, BMI 40-49.9 (morbid obesity)    Uncontrolled diabetes mellitus type 2 without complications    History of colon polyps    Pain of right thumb    Degenerative arthritis of thumb    Encounter for wellness examination    Vitamin D deficiency            Patient Instructions     None       No

## 2022-04-13 NOTE — ED PROVIDER NOTE - OBJECTIVE STATEMENT
43 YO with history of herniated disc and lower back pain is coming to ED with pain. Pt endorses this pain feels different and radiates from lower sacral area to right hip. 43 YO with history of herniated disc and lower back pain is coming to ED with pain. Pt endorses this pain feels different and radiates from lower sacral area to right hip. Patient denies fever, weight loss, skin rash, numbness, tingling, weight lifting, fall or any trauma     Straight leg test elicits pain In left leg.

## 2022-04-27 ENCOUNTER — APPOINTMENT (OUTPATIENT)
Dept: ORTHOPEDIC SURGERY | Facility: CLINIC | Age: 42
End: 2022-04-27

## 2022-04-29 ENCOUNTER — APPOINTMENT (OUTPATIENT)
Dept: ORTHOPEDIC SURGERY | Facility: CLINIC | Age: 42
End: 2022-04-29
Payer: MEDICAID

## 2022-04-29 VITALS
SYSTOLIC BLOOD PRESSURE: 112 MMHG | BODY MASS INDEX: 37.32 KG/M2 | HEIGHT: 65 IN | WEIGHT: 224 LBS | HEART RATE: 65 BPM | DIASTOLIC BLOOD PRESSURE: 76 MMHG

## 2022-04-29 VITALS — TEMPERATURE: 97.1 F

## 2022-04-29 DIAGNOSIS — M43.16 SPONDYLOLISTHESIS, LUMBAR REGION: ICD-10-CM

## 2022-04-29 PROCEDURE — 99203 OFFICE O/P NEW LOW 30 MIN: CPT

## 2022-04-29 PROCEDURE — 72100 X-RAY EXAM L-S SPINE 2/3 VWS: CPT

## 2022-04-29 NOTE — PHYSICAL EXAM
[de-identified] : Examination of the lumbar spine reveals no midline tenderness palpation, step-offs, or skin lesions. Decreased range of motion with respect to flexion, extension, lateral bending, and rotation. No tenderness to palpation of the sciatic notch. No tenderness palpation of the bilateral greater trochanters. No pain with passive internal/external rotation of the hips. No instability of bilateral lower extremities.  Negative JESSICA. Negative straight leg raise bilaterally. No bowstring. Negative femoral stretch. 5 out of 5 iliopsoas, hip abductors, hips adductors, quadriceps, hamstrings, gastrocsoleus, tibialis anterior, extensor hallucis longus, peroneals. Grossly intact sensation to light touch bilateral lower extremities. 1+ patellar and Achilles reflexes. Downgoing Babinski. No clonus. Intact proprioception. Palpable pulses. No skin lesion and no edema on the right and left lower extremities. [de-identified] : AP lateral lumbar x-rays does reveal L4-5 spondylolisthesis

## 2022-04-29 NOTE — DISCUSSION/SUMMARY
[de-identified] : We discussed further treatment options.  She will try course of physical therapy.  MRI if not improved or worsen.

## 2022-04-29 NOTE — HISTORY OF PRESENT ILLNESS
[de-identified] : Ms. LEIDY AVILES  is a 42 year old female who presents with a chronic history of low back pain since a fall at home a couple of years ago.  Denies any LE radicular symptoms.  Normal bowel and bladder control.   Denies any recent fevers, chills, sweats, weight loss, or infection.  She is taking OTC medications with mild relief. \par \par The patients past medical history, past surgical history, medications, allergies, and social history were reviewed by me today with the patient and documented accordingly.  In addition, the patient's family history, which is noncontributory to their visit, was also reviewed.\par

## 2022-08-03 ENCOUNTER — APPOINTMENT (OUTPATIENT)
Dept: HEART AND VASCULAR | Facility: CLINIC | Age: 42
End: 2022-08-03

## 2022-08-03 PROCEDURE — 36415 COLL VENOUS BLD VENIPUNCTURE: CPT

## 2022-08-04 LAB
MEV IGG FLD QL IA: 88 AU/ML
MEV IGG+IGM SER-IMP: POSITIVE
MUV AB SER-ACNC: NEGATIVE
MUV IGG SER QL IA: 8.3 AU/ML
RUBV IGG FLD-ACNC: 5.1 INDEX
RUBV IGG SER-IMP: POSITIVE
VZV AB TITR SER: POSITIVE
VZV IGG SER IF-ACNC: 334.8 INDEX

## 2022-08-07 LAB
M TB IFN-G BLD-IMP: NEGATIVE
QUANTIFERON TB PLUS MITOGEN MINUS NIL: >10 IU/ML
QUANTIFERON TB PLUS NIL: 0.02 IU/ML
QUANTIFERON TB PLUS TB1 MINUS NIL: 0.02 IU/ML
QUANTIFERON TB PLUS TB2 MINUS NIL: 0.01 IU/ML

## 2022-08-08 LAB
AMPHET UR-MCNC: NEGATIVE
BARBITURATES UR-MCNC: NEGATIVE
BENZODIAZ UR-MCNC: NEGATIVE
COCAINE METAB.OTHER UR-MCNC: NEGATIVE
CREATININE, URINE: 261.2 MG/DL
METHADONE UR-MCNC: NEGATIVE
METHAQUALONE UR-MCNC: NEGATIVE
OPIATES UR-MCNC: NEGATIVE
PCP UR-MCNC: NEGATIVE
PROPOXYPH UR QL: NEGATIVE
THC UR QL: NEGATIVE

## 2022-11-30 ENCOUNTER — EMERGENCY (EMERGENCY)
Facility: HOSPITAL | Age: 42
LOS: 1 days | Discharge: HOME | End: 2022-11-30
Attending: EMERGENCY MEDICINE
Payer: MEDICAID

## 2022-11-30 VITALS
RESPIRATION RATE: 16 BRPM | HEIGHT: 65 IN | WEIGHT: 197.09 LBS | OXYGEN SATURATION: 100 % | DIASTOLIC BLOOD PRESSURE: 83 MMHG | SYSTOLIC BLOOD PRESSURE: 125 MMHG | TEMPERATURE: 98 F | HEART RATE: 66 BPM

## 2022-11-30 DIAGNOSIS — Z98.89 OTHER SPECIFIED POSTPROCEDURAL STATES: Chronic | ICD-10-CM

## 2022-11-30 LAB
ALBUMIN SERPL ELPH-MCNC: 3.6 G/DL — SIGNIFICANT CHANGE UP (ref 3.5–5)
ALP SERPL-CCNC: 54 U/L — SIGNIFICANT CHANGE UP (ref 40–120)
ALT FLD-CCNC: 28 U/L DA — SIGNIFICANT CHANGE UP (ref 10–60)
ANION GAP SERPL CALC-SCNC: 8 MMOL/L — SIGNIFICANT CHANGE UP (ref 5–17)
APPEARANCE UR: ABNORMAL
AST SERPL-CCNC: 16 U/L — SIGNIFICANT CHANGE UP (ref 10–40)
BACTERIA # UR AUTO: ABNORMAL /HPF
BASOPHILS # BLD AUTO: 0.03 K/UL — SIGNIFICANT CHANGE UP (ref 0–0.2)
BASOPHILS NFR BLD AUTO: 0.4 % — SIGNIFICANT CHANGE UP (ref 0–2)
BILIRUB SERPL-MCNC: 0.5 MG/DL — SIGNIFICANT CHANGE UP (ref 0.2–1.2)
BILIRUB UR-MCNC: NEGATIVE — SIGNIFICANT CHANGE UP
BLD GP AB SCN SERPL QL: SIGNIFICANT CHANGE UP
BUN SERPL-MCNC: 13 MG/DL — SIGNIFICANT CHANGE UP (ref 7–18)
CALCIUM SERPL-MCNC: 9 MG/DL — SIGNIFICANT CHANGE UP (ref 8.4–10.5)
CHLORIDE SERPL-SCNC: 107 MMOL/L — SIGNIFICANT CHANGE UP (ref 96–108)
CO2 SERPL-SCNC: 24 MMOL/L — SIGNIFICANT CHANGE UP (ref 22–31)
COLOR SPEC: YELLOW — SIGNIFICANT CHANGE UP
CREAT SERPL-MCNC: 0.61 MG/DL — SIGNIFICANT CHANGE UP (ref 0.5–1.3)
DIFF PNL FLD: ABNORMAL
EGFR: 116 ML/MIN/1.73M2 — SIGNIFICANT CHANGE UP
EOSINOPHIL # BLD AUTO: 0.11 K/UL — SIGNIFICANT CHANGE UP (ref 0–0.5)
EOSINOPHIL NFR BLD AUTO: 1.3 % — SIGNIFICANT CHANGE UP (ref 0–6)
EPI CELLS # UR: ABNORMAL /HPF
GLUCOSE SERPL-MCNC: 87 MG/DL — SIGNIFICANT CHANGE UP (ref 70–99)
GLUCOSE UR QL: NEGATIVE — SIGNIFICANT CHANGE UP
HCG SERPL-ACNC: <1 MIU/ML — SIGNIFICANT CHANGE UP
HCG UR QL: NEGATIVE — SIGNIFICANT CHANGE UP
HCT VFR BLD CALC: 40.2 % — SIGNIFICANT CHANGE UP (ref 34.5–45)
HGB BLD-MCNC: 13.4 G/DL — SIGNIFICANT CHANGE UP (ref 11.5–15.5)
IMM GRANULOCYTES NFR BLD AUTO: 0.2 % — SIGNIFICANT CHANGE UP (ref 0–0.9)
KETONES UR-MCNC: ABNORMAL
LEUKOCYTE ESTERASE UR-ACNC: ABNORMAL
LYMPHOCYTES # BLD AUTO: 2 K/UL — SIGNIFICANT CHANGE UP (ref 1–3.3)
LYMPHOCYTES # BLD AUTO: 23.9 % — SIGNIFICANT CHANGE UP (ref 13–44)
MCHC RBC-ENTMCNC: 29 PG — SIGNIFICANT CHANGE UP (ref 27–34)
MCHC RBC-ENTMCNC: 33.3 GM/DL — SIGNIFICANT CHANGE UP (ref 32–36)
MCV RBC AUTO: 87 FL — SIGNIFICANT CHANGE UP (ref 80–100)
MONOCYTES # BLD AUTO: 0.67 K/UL — SIGNIFICANT CHANGE UP (ref 0–0.9)
MONOCYTES NFR BLD AUTO: 8 % — SIGNIFICANT CHANGE UP (ref 2–14)
NEUTROPHILS # BLD AUTO: 5.55 K/UL — SIGNIFICANT CHANGE UP (ref 1.8–7.4)
NEUTROPHILS NFR BLD AUTO: 66.2 % — SIGNIFICANT CHANGE UP (ref 43–77)
NITRITE UR-MCNC: NEGATIVE — SIGNIFICANT CHANGE UP
NRBC # BLD: 0 /100 WBCS — SIGNIFICANT CHANGE UP (ref 0–0)
PH UR: 5 — SIGNIFICANT CHANGE UP (ref 5–8)
PLATELET # BLD AUTO: 157 K/UL — SIGNIFICANT CHANGE UP (ref 150–400)
POTASSIUM SERPL-MCNC: 3.9 MMOL/L — SIGNIFICANT CHANGE UP (ref 3.5–5.3)
POTASSIUM SERPL-SCNC: 3.9 MMOL/L — SIGNIFICANT CHANGE UP (ref 3.5–5.3)
PROT SERPL-MCNC: 6.7 G/DL — SIGNIFICANT CHANGE UP (ref 6–8.3)
PROT UR-MCNC: 30 MG/DL
RBC # BLD: 4.62 M/UL — SIGNIFICANT CHANGE UP (ref 3.8–5.2)
RBC # FLD: 12.9 % — SIGNIFICANT CHANGE UP (ref 10.3–14.5)
RBC CASTS # UR COMP ASSIST: ABNORMAL /HPF (ref 0–2)
SODIUM SERPL-SCNC: 139 MMOL/L — SIGNIFICANT CHANGE UP (ref 135–145)
SP GR SPEC: 1.02 — SIGNIFICANT CHANGE UP (ref 1.01–1.02)
UROBILINOGEN FLD QL: 1
WBC # BLD: 8.38 K/UL — SIGNIFICANT CHANGE UP (ref 3.8–10.5)
WBC # FLD AUTO: 8.38 K/UL — SIGNIFICANT CHANGE UP (ref 3.8–10.5)
WBC UR QL: ABNORMAL /HPF (ref 0–5)

## 2022-11-30 PROCEDURE — 86900 BLOOD TYPING SEROLOGIC ABO: CPT

## 2022-11-30 PROCEDURE — 80053 COMPREHEN METABOLIC PANEL: CPT

## 2022-11-30 PROCEDURE — 86850 RBC ANTIBODY SCREEN: CPT

## 2022-11-30 PROCEDURE — 81001 URINALYSIS AUTO W/SCOPE: CPT

## 2022-11-30 PROCEDURE — 76801 OB US < 14 WKS SINGLE FETUS: CPT | Mod: 26

## 2022-11-30 PROCEDURE — 36415 COLL VENOUS BLD VENIPUNCTURE: CPT

## 2022-11-30 PROCEDURE — 84702 CHORIONIC GONADOTROPIN TEST: CPT

## 2022-11-30 PROCEDURE — 86901 BLOOD TYPING SEROLOGIC RH(D): CPT

## 2022-11-30 PROCEDURE — 99285 EMERGENCY DEPT VISIT HI MDM: CPT

## 2022-11-30 PROCEDURE — 76817 TRANSVAGINAL US OBSTETRIC: CPT

## 2022-11-30 PROCEDURE — 76817 TRANSVAGINAL US OBSTETRIC: CPT | Mod: 26

## 2022-11-30 PROCEDURE — 99284 EMERGENCY DEPT VISIT MOD MDM: CPT | Mod: 25

## 2022-11-30 PROCEDURE — 85025 COMPLETE CBC W/AUTO DIFF WBC: CPT

## 2022-11-30 PROCEDURE — 81025 URINE PREGNANCY TEST: CPT

## 2022-11-30 PROCEDURE — 76801 OB US < 14 WKS SINGLE FETUS: CPT

## 2022-11-30 RX ORDER — SODIUM CHLORIDE 9 MG/ML
3 INJECTION INTRAMUSCULAR; INTRAVENOUS; SUBCUTANEOUS ONCE
Refills: 0 | Status: COMPLETED | OUTPATIENT
Start: 2022-11-30 | End: 2022-11-30

## 2022-11-30 RX ADMIN — SODIUM CHLORIDE 3 MILLILITER(S): 9 INJECTION INTRAMUSCULAR; INTRAVENOUS; SUBCUTANEOUS at 17:42

## 2022-11-30 NOTE — ED ADULT NURSE NOTE - NSICDXPASTSURGICALHX_GEN_ALL_CORE_FT
S/p new AICD implanted on 1-3-2019. Appropriate device function demonstrated. Wound site appears clear. Advised to watch for redness,swelling, oozing or fever. Pt verbalized understanding. See back in 6 weeks for final testing.    Addendum:  Patient and family have numerous questions re: hospitalization. Appt made for 1- with APN for hospital f/u.   PAST SURGICAL HISTORY:  S/P  section

## 2022-11-30 NOTE — ED PROVIDER NOTE - OBJECTIVE STATEMENT
42 year old female with past medical history of hypertension presenting to the ED with complaints of heavy vaginal bleeding. The patient reports that her last menstrual period was on October 1, but she states that she is unsure wether she is pregnant. The patient denies any other symptoms including dizziness, nausea, and vomiting. NKDA.

## 2022-11-30 NOTE — ED ADULT NURSE NOTE - NS ED NURSE RECORD ANOTHER VITAL SIGN
[facility]      Joel Pineda is a 50 year old male who is being evaluated via a billable virtual visit.      VIDEO VISIT   How would you like to obtain your AVS? MyChart  If you are dropped from the video visit, the video invite should be resent to: Text to cell phone: [phone]  Will anyone else be joining your video visit? NO,  Is patient CURRENTLY in [place]? YES    Alexandre Curran MA    If patient encounters technical issues they should call [phone]    Video-Visit Details  Type of service:  Video Visit  Video Start Time: 2:09 PM  Video End Time: 2:36 PM  Total Face to Face Time: 33 minutes   Originating Location (pt. Location): Home  Distant Location (provider location):  [facility]   Platform used for Video Visit: Sophia       PEG Score 10/13/2021 2022 2022   PEG Total Score 6.67 7.33 5.33          CHIEF COMPLAINT: Chronic pain    INTERVAL HISTORY:  Last seen on 21.        Recommendations/plan at the last visit included:  1. Virtual follow-up with JOCELYN Zavala, NP-C in 2-3 months or sooner as needed  2. Other: [facility] will re-certify you for medical cannabis   3. Medication Management :   1. Methocarbamol 500 m-2 tabs up to 4 times day as needed.     Since last visit:   - 22 4th Bilateral Lateral femoral cutaneous nerve block with Dr Montgomery:   - Has been going to [facility] for plantar fasciitis which is helpful.  - Has hydradenitis suprativa at the gluteal cleft, has surgery to debride and allow the wound to heal from inside out. Healed over now. No wound vac used.   - Has a lot of abdominal pain. Has had Bentyl in the past, caused GERD and he is concerned about previous side effect.   - Has had Baclofen in the past. Unsure of why it was changed to Methocarbamol from Baclofen.     Pain Information Today: Score: Moderate Pain (4)/10. Location of pain: Multifocal pain.     Annual Requirements last collected:  N/A     Current Pain  Relevant Medications:    Acetaminophen 500 mg BID & with Oxycodone.   Cymbalta 120 mg in AM  Lyrica 150 mg BID  Methocarbamol 500 mg 1-2 QID PRN  Nabumetone 500 mg 1-2 times a day  Lidocaine gel topically 2-3 times daily    Pain Related Controlled Substances:   Clonazepam 0.5 mg at HS. Occasionally 1 tab during the day.  Lunesta 3 mg  Oxycodone 5 mg 1-2 tabs per day PRN              Total opiate dose: 7.5-15 MME     Previous Pain Relevant Medications: (H--helped; HI--Helped initially; [facility]--Somewhat helpful; NH--No help; W--worse; SE--side effects; ?--Unsure if helpful)   NOTE: This medication information taken from patient's intake form, not medical records.               Opiates: Oxycodone: H, Tramadol:[facility]. SE, Codeine: NH              NSAIDS: Celebrex: [facility], Nabumetone:H, Naproxen: SE              Anti-migraine medications: Midrin? , Maxalt:H              Muscle Relaxants: Baclofen:[facility], Flexeril:H, Tizaidine:?, Methocarbamol:?              Neuropathics: Lyrica:H  Anti-depressants: Abilify:SE, Brintellix: NH, Wellbutrin: ?, Cymbalta: NH, Nortriptyline: NH, Seroquel:   [facility], Risperdal: NH, Effexor:?, Cymbalta ?              Anxiety medications: Xanax: H, Klonpin: H, lorazepam: H                  Topicals: Lidocaine:H              Sleep medications:Belsomra: NH, Melatonin:H, Trazodone NH, Ambien:NH              Other medications not covered above: Humira: All, Enbrel; H, dicyclomine:H, Medrol:[facility], Prednisone:H  This will be added at a later date when new patient packet is available.      Any illicit drug use: denies   EtOH use: none   Caffeine use: 6-8 per day   Nicotine use: None     Past Pain Treatments:               Pain Clinic:   Yes    [facility] pain management: For spinal injections with Dr Diaz, [facility]: injections, nerve ablation, [facility] for SCS treatment.  Did trial but did not find it beneficial.   [facility] chronic pain program.                   PT: Yes  For other reasons. Neck, back and  feet              Psychologist: Yes ongoing with private therapist.at  [facility].               Chiropractor: Yes years ago              Acupuncture: Yes [facility]              Pharmacotherapy:                           Opioids: Yes                            Non-opioids:    Yes               TENs Unit:Yes H for back               Injections: Yes Many for neck and back               Surgeries related to pain: Yes               October 2007 L5-S1 laminectomy, micro discectomy          THE 4 As OF OPIOID MAINTENANCE ANALGESIA    Analgesia: Is pain relief clinically significant? YES   Activity: Is patient functional and able to perform Activities of Daily Living? YES   Adverse effects: Is patient free from adverse side effects from opiates? YES   Adherence to Rx protocol: Is patient adhering to Controlled Substance Agreement and taking medications ONLY as ordered? YES     Is Narcan prescribed for opiate use >50 MME daily or concurrent use of opiates and benzodiazepines?  Yes prescribed by this writer 8/10/2020    [facility] Data Base Reviewed:    YES; As expected, no concern for misuse/abuse of controlled medications based on this report. Reviewed [facility] June 29, 2022- no concerning fills.    _______________________________________________      Physical Exam unable to be completed due to virtual visit. There were no vitals taken for this visit or reported by patient.     General: Verbal, alert and no distress   Psychiatric:  affect and mood normal, mentation appears normal.     DIRE Score for ongoing opioid management is calculated as follows:    Diagnosis = 3 pts (advanced condition; severe pain/objective findings)    Intractability = 3 pts (patient fully engaged but inadequate response to treatments)    Risk        Psych = 3 pts (no significant personality dysfunction/mental illness; good communication with clinic)         Chem Hlth = 3 pts (no history of chemical dependency; not  drug-focused)       Reliability = 2 pts (occasional difficulties with compliance; generally reliable)       Social = 2 pts (reduction in some relationships/life rolls)       (Psych + Chem hlth + Reliability + Social) = 16    Efficacy = 2 pts (moderate benefit/function; low med dose; too early/not tried meds)    DIRE Score = 18        7-13: likely NOT suitable candidate for long-term opioid analgesia       14-21: may be a suitable candidate for long-term opioid analgesia     Previous Diagnostic Tests:   Imaging Studies:   MR LUMBAR SPINE W/O CONTRAST 6/27/2019  Impression:   1. Multilevel lumbar spondylosis, most pronounced at L5-S1 with  moderate to severe left and moderate right neural foraminal stenosis as well as narrowing of the left lateral recess and abutment the traversing left S1 nerve root.   2. Additional multilevel degenerative changes of the lumbar spine as described above.     PAIN RELEVANT CONDITIONS:   1.  Postherpetic neuralgia  2.  Ankylosing spondylosis, Lumbar DDD with left S1 nerve involvement, lumbar stenosis  3.  Chronic migraine headaches  4.  History: Complex medical issues, see history      ASSESSMENT AND PLAN    (G89.29) Chronic intractable pain  (primary encounter diagnosis)  (M54.16) Lumbar radiculopathy  (R10.9) Abdominal cramping  (M51.36) DDD (degenerative disc disease), lumbar  (M45.8) Ankylosing spondylitis of sacral region (H)  Comment: no changes to POC except adding Baclofen  Plan: Physical Therapy Referral  baclofen (LIORESAL) 10 MG tablet      PATIENT INSTRUCTIONS:     Diagnosis reviewed, treatment option addressed, and risk/benefits discussed.  Self-care instructions given.  I am recommending a multidisciplinary treatment plan to help this patient better manage pain.    Remember to request ALL medication refills 5 BUSINESS days before you run out.     1. Physical therapy: YES, Continue per therapist's recommendations. Order will be faxed.   2. Clinic follow-up with [facility]  JOCELYN Jack, NP-C in 6-8 weeks .   3. Procedures recommended: OK to repeat block injections after 9/20/22   4. Medication Management : Baclofen: take as directed.     I have reviewed the note as documented above.  This accurately captures the substance of my conversation with the patient.    BILLING TIME DOCUMENTATION:   TOTAL TIME on the date of service includes:   Time spent preparing to see the patient: 0 minutes (reviewing records and tests)  Time spend face to face with the patient: 33 minutes  Time spent ordering tests, medications, procedures and referrals: 0 minutes  Time spent Referring and communicating with other healthcare professionals: 0 minutes  Documenting clinical information in Epic: 0 minutes    The total TIME spent on this patient on the day of the appointment was 33 minutes.     JOCELYN Padgett, NP-C  [facility]    (Information in italics and blue color are taken from previous pain medical providers notes and are documented as such)     Yes

## 2022-11-30 NOTE — ED PROVIDER NOTE - NSFOLLOWUPINSTRUCTIONS_ED_ALL_ED_FT
[facility]                                                                                                      Abnormal Uterine Bleeding    A female body showing the reproductive organs, with a close-up view of the uterus and vagina.   Abnormal uterine bleeding is unusual bleeding from the uterus. It includes bleeding after sex, or bleeding or spotting between menstrual periods. It may also include bleeding that is heavier than normal, menstrual periods that last longer than usual, or bleeding that occurs after menopause.    Abnormal uterine bleeding can affect teenagers, women in their reproductive years, pregnant women, and women who have reached menopause. Common causes of abnormal uterine bleeding include:  •Pregnancy.      •Abnormal growths within the lining of the uterus (polyps).      •Benign tumors or growths in the uterus (fibroids). These are not cancer.      •Infection.      •Cancer.      •Too much or too little of some hormones in the body (hormonal imbalances).      Any type of abnormal bleeding should be checked by a health care provider. Many cases are minor and simple to treat, but others may be more serious. Treatment will depend on the cause of the bleeding and how severe it is.      Follow these instructions at home:    Medicines     •Take over-the-counter and prescription medicines only as told by your health care provider.    •Ask your health care provider about:  •Taking medicines such as aspirin and ibuprofen. These medicines can thin your blood. Do not take these medicines unless your health care provider tells you to take them.      •Taking over-the-counter medicines, vitamins, herbs, and supplements.        •If you were prescribed iron pills, take them as told by your health care provider. Iron pills help to replace iron that your body loses because of this condition.      Managing constipation     In cases of severe bleeding, you may be asked to increase your iron intake to treat anemia. Doing this may cause constipation. To prevent or treat constipation, you may need to:  •Drink enough fluid to keep your urine pale yellow.      •Take over-the-counter or prescription medicines.      •Eat foods that are high in fiber, such as beans, whole grains, and fresh fruits and vegetables.      •Limit foods that are high in fat and processed sugars, such as fried or sweet foods.      Activity    Alter your activity to decrease bleeding if you need to change your sanitary pad more than one time every 2 hours:  •Lie in bed with your feet raised (elevated).      •Place a cold pack on your lower abdomen.      •Rest as much as possible until the bleeding stops or slows down.      General instructions    •Do not use tampons, douche, or have sex until your health care provider says these things are okay.      •Change your sanitary pads often.      •Get regular exams. These include pelvic exams and cervical cancer screenings.      •It is up to you to get the results of any tests that are done. Ask your health care provider, or the department that is doing the tests, when your results will be ready.    •Monitor your condition for any changes. For 2 months, write down:  •When your menstrual period starts.      •When your menstrual period ends.      •When any abnormal vaginal bleeding occurs.      •What problems you notice.        •Keep all follow-up visits. This is important.        Contact a health care provider if:    •You have bleeding that lasts for more than one week.      •You feel dizzy at times.      •You feel nauseous or you vomit.      •You feel light-headed or weak.      •You notice any other changes that show that your condition is getting worse.        Get help right away if:    •You faint.      •You have bleeding that soaks through a sanitary pad every hour.      •You have pain in the abdomen.      •You have a fever or chills.      •You become sweaty or weak.      •You pass large blood clots from your vagina.      These symptoms may represent a serious problem that is an emergency. Do not wait to see if the symptoms will go away. Get medical help right away. Call your local emergency services (911 in the [place] Do not drive yourself to the hospital.       Summary    •Abnormal uterine bleeding is unusual bleeding from the uterus.      •Any type of abnormal bleeding should be checked by a health care provider. Many cases are minor and simple to treat, but others may be more serious.      •Treatment will depend on the cause of the bleeding and how severe it is.      •Get help right away if you faint, you have bleeding that soaks through a sanitary pad every hour, or you pass large blood clots from your vagina.      This information is not intended to replace advice given to you by your health care provider. Make sure you discuss any questions you have with your health care provider.      Document Revised: 04/19/2022 Document Reviewed: 04/19/2022    Elsevier Patient Education © 2022 [employer] Inc[employer]

## 2022-11-30 NOTE — ED PROVIDER NOTE - CLINICAL SUMMARY MEDICAL DECISION MAKING FREE TEXT BOX
42 year old female with past medical history of hypertension presenting to the ED with complaints of heavy vaginal bleeding. Urine pregnancy was negative. Will obtain ultrasound, CBC, CMP, type + screen, and beta hCG, then reassess.

## 2022-11-30 NOTE — ED ADULT NURSE NOTE - OBJECTIVE STATEMENT
Pt presents to the ED with c/o vaginal bleeding since yesterday. Pt denies abdominal pain, nausea, or vomiting.

## 2022-11-30 NOTE — ED PROVIDER NOTE - CARE PLAN
Notified ERP of patient's pain 7/10, new orders placed.    Principal Discharge DX:	Vaginal bleeding   1

## 2022-11-30 NOTE — ED PROVIDER NOTE - PATIENT PORTAL LINK FT
You can access the FollowMyHealth Patient Portal offered by [facility] by registering at the following website: [email or web address]. By joining [employer]’s FollowMyHealth portal, you will also be able to view your health information using other applications (apps) compatible with our system.

## 2023-02-21 NOTE — ED ADULT NURSE NOTE - CCCP TRG CHIEF CMPLNT
palpitations
Render In Strict Bullet Format?: No
Modify Regimen: AM:\\nWash face with gentle cleanser\\nApply Clindamycin wipes to entire face\\nApply Winlevi to entire face\\nApply moisturizer w/SPF\\n\\nPM:\\nWash face with gentle cleanser\\nApply small pea sized amount of Arazlo to entire face nightly. May use on KP on arms. \\nApply Winlevi to entire face\\nApply moisturizer as needed\\n\\nOrally:\\nTake 1 capsule of Oracea daily with food and water
Plan: No history of anxiety/depression.\\n\\nPatient does not wish to start Accutane today.\\n\\nRecommended Cicalfate for the scabbed areas that she has picked\\n\\nConsider spironolactone if acne does not improve. Plan to taper off Oracea.
Detail Level: Zone

## 2023-07-04 ENCOUNTER — RESULT CHARGE (OUTPATIENT)
Age: 43
End: 2023-07-04

## 2023-07-05 ENCOUNTER — APPOINTMENT (OUTPATIENT)
Dept: INTERNAL MEDICINE | Facility: CLINIC | Age: 43
End: 2023-07-05
Payer: MEDICAID

## 2023-07-05 ENCOUNTER — NON-APPOINTMENT (OUTPATIENT)
Age: 43
End: 2023-07-05

## 2023-07-05 VITALS
HEIGHT: 65 IN | WEIGHT: 214 LBS | RESPIRATION RATE: 15 BRPM | DIASTOLIC BLOOD PRESSURE: 81 MMHG | OXYGEN SATURATION: 99 % | TEMPERATURE: 98.2 F | SYSTOLIC BLOOD PRESSURE: 119 MMHG | BODY MASS INDEX: 35.65 KG/M2 | HEART RATE: 88 BPM

## 2023-07-05 DIAGNOSIS — Z00.00 ENCOUNTER FOR GENERAL ADULT MEDICAL EXAMINATION W/OUT ABNORMAL FINDINGS: ICD-10-CM

## 2023-07-05 DIAGNOSIS — Z78.9 OTHER SPECIFIED HEALTH STATUS: ICD-10-CM

## 2023-07-05 DIAGNOSIS — E66.01 MORBID (SEVERE) OBESITY DUE TO EXCESS CALORIES: ICD-10-CM

## 2023-07-05 DIAGNOSIS — E66.9 OBESITY, UNSPECIFIED: ICD-10-CM

## 2023-07-05 DIAGNOSIS — Z01.818 ENCOUNTER FOR OTHER PREPROCEDURAL EXAMINATION: ICD-10-CM

## 2023-07-05 LAB
APPEARANCE: CLEAR
APTT BLD: 29.7 SEC
BILIRUBIN URINE: NEGATIVE
BLOOD URINE: NEGATIVE
COLOR: YELLOW
GLUCOSE QUALITATIVE U: NEGATIVE MG/DL
KETONES URINE: NEGATIVE MG/DL
LEUKOCYTE ESTERASE URINE: NEGATIVE
NITRITE URINE: NEGATIVE
PH URINE: 5.5
PROTEIN URINE: NEGATIVE MG/DL
SPECIFIC GRAVITY URINE: 1.03
UROBILINOGEN URINE: 0.2 MG/DL

## 2023-07-05 PROCEDURE — 93000 ELECTROCARDIOGRAM COMPLETE: CPT

## 2023-07-05 PROCEDURE — 99396 PREV VISIT EST AGE 40-64: CPT | Mod: 25

## 2023-07-05 RX ORDER — SPIRONOLACTONE 50 MG/1
TABLET ORAL
Refills: 0 | Status: ACTIVE | COMMUNITY

## 2023-07-05 NOTE — ASSESSMENT
[FreeTextEntry1] : CPE OF 43 Y OLD FEM WITH PMX OF OBESITY  AND GERD= LABS AND EKG \par RTO 1 Y OR PRN

## 2023-07-05 NOTE — HISTORY OF PRESENT ILLNESS
[de-identified] : COMES FOR CPE AND PREOP FOR TUBAL LIGATION BY DR CASTELLANOS 7/28/23\par ON SPIRONOLACTONE DAILY FOR 1 M FOR HAIR LOSS

## 2023-07-05 NOTE — PHYSICAL EXAM
[No Acute Distress] : no acute distress [Well Nourished] : well nourished [Well Developed] : well developed [Well-Appearing] : well-appearing [Normal Sclera/Conjunctiva] : normal sclera/conjunctiva [EOMI] : extraocular movements intact [Normal Outer Ear/Nose] : the outer ears and nose were normal in appearance [No JVD] : no jugular venous distention [No Lymphadenopathy] : no lymphadenopathy [Supple] : supple [Thyroid Normal, No Nodules] : the thyroid was normal and there were no nodules present [No Respiratory Distress] : no respiratory distress  [No Accessory Muscle Use] : no accessory muscle use [Clear to Auscultation] : lungs were clear to auscultation bilaterally [Normal Rate] : normal rate  [Regular Rhythm] : with a regular rhythm [Normal S1, S2] : normal S1 and S2 [No Murmur] : no murmur heard [No Carotid Bruits] : no carotid bruits [No Abdominal Bruit] : a ~M bruit was not heard ~T in the abdomen [No Varicosities] : no varicosities [Pedal Pulses Present] : the pedal pulses are present [No Edema] : there was no peripheral edema [No Palpable Aorta] : no palpable aorta [No Extremity Clubbing/Cyanosis] : no extremity clubbing/cyanosis [Soft] : abdomen soft [Non Tender] : non-tender [Non-distended] : non-distended [No Masses] : no abdominal mass palpated [No HSM] : no HSM [Normal Bowel Sounds] : normal bowel sounds [Obese] : obese [Normal Anterior Cervical Nodes] : no anterior cervical lymphadenopathy [No CVA Tenderness] : no CVA  tenderness [No Spinal Tenderness] : no spinal tenderness [No Joint Swelling] : no joint swelling [Grossly Normal Strength/Tone] : grossly normal strength/tone [No Rash] : no rash [Coordination Grossly Intact] : coordination grossly intact [No Focal Deficits] : no focal deficits [Normal Affect] : the affect was normal [Normal Insight/Judgement] : insight and judgment were intact

## 2023-07-06 ENCOUNTER — NON-APPOINTMENT (OUTPATIENT)
Age: 43
End: 2023-07-06

## 2023-07-06 LAB
25(OH)D3 SERPL-MCNC: 28.7 NG/ML
ALBUMIN SERPL ELPH-MCNC: 4.5 G/DL
ALP BLD-CCNC: 61 U/L
ALT SERPL-CCNC: 23 U/L
ANION GAP SERPL CALC-SCNC: 18 MMOL/L
AST SERPL-CCNC: 23 U/L
BILIRUB SERPL-MCNC: 0.3 MG/DL
BUN SERPL-MCNC: 14 MG/DL
CALCIUM SERPL-MCNC: 9.4 MG/DL
CHLORIDE SERPL-SCNC: 104 MMOL/L
CHOLEST SERPL-MCNC: 125 MG/DL
CO2 SERPL-SCNC: 18 MMOL/L
CREAT SERPL-MCNC: 0.65 MG/DL
EGFR: 113 ML/MIN/1.73M2
GLUCOSE SERPL-MCNC: 98 MG/DL
HCG SERPL QL: NEGATIVE
HDLC SERPL-MCNC: 38 MG/DL
LDLC SERPL CALC-MCNC: 76 MG/DL
NONHDLC SERPL-MCNC: 87 MG/DL
PAPP-A SERPL-ACNC: <1 MIU/ML
POTASSIUM SERPL-SCNC: 4.8 MMOL/L
PROT SERPL-MCNC: 6.7 G/DL
SODIUM SERPL-SCNC: 140 MMOL/L
TRIGL SERPL-MCNC: 55 MG/DL
TSH SERPL-ACNC: 3.06 UIU/ML

## 2023-07-07 LAB
INR PPP: 1 RATIO
PT BLD: 11.8 SEC

## 2023-08-09 ENCOUNTER — APPOINTMENT (OUTPATIENT)
Dept: BARIATRICS | Facility: CLINIC | Age: 43
End: 2023-08-09

## 2023-09-06 ENCOUNTER — APPOINTMENT (OUTPATIENT)
Dept: BARIATRICS | Facility: CLINIC | Age: 43
End: 2023-09-06

## 2023-10-11 ENCOUNTER — NON-APPOINTMENT (OUTPATIENT)
Age: 43
End: 2023-10-11

## 2024-02-21 ENCOUNTER — NON-APPOINTMENT (OUTPATIENT)
Age: 44
End: 2024-02-21

## 2024-02-22 ENCOUNTER — APPOINTMENT (OUTPATIENT)
Dept: AFTER HOURS CARE | Facility: EMERGENCY ROOM | Age: 44
End: 2024-02-22

## 2024-02-27 ENCOUNTER — EMERGENCY (EMERGENCY)
Facility: HOSPITAL | Age: 44
LOS: 1 days | Discharge: HOME | End: 2024-02-27
Attending: EMERGENCY MEDICINE
Payer: MEDICAID

## 2024-02-27 VITALS
HEART RATE: 61 BPM | OXYGEN SATURATION: 100 % | DIASTOLIC BLOOD PRESSURE: 73 MMHG | RESPIRATION RATE: 18 BRPM | TEMPERATURE: 98 F | SYSTOLIC BLOOD PRESSURE: 120 MMHG

## 2024-02-27 VITALS
HEART RATE: 71 BPM | OXYGEN SATURATION: 98 % | DIASTOLIC BLOOD PRESSURE: 86 MMHG | HEIGHT: 65 IN | RESPIRATION RATE: 18 BRPM | WEIGHT: 220.02 LBS | SYSTOLIC BLOOD PRESSURE: 133 MMHG | TEMPERATURE: 98 F

## 2024-02-27 DIAGNOSIS — Z98.89 OTHER SPECIFIED POSTPROCEDURAL STATES: Chronic | ICD-10-CM

## 2024-02-27 LAB
ALBUMIN SERPL ELPH-MCNC: 3.2 G/DL — LOW (ref 3.5–5)
ALP SERPL-CCNC: 61 U/L — SIGNIFICANT CHANGE UP (ref 40–120)
ALT FLD-CCNC: 33 U/L DA — SIGNIFICANT CHANGE UP (ref 10–60)
ANION GAP SERPL CALC-SCNC: 6 MMOL/L — SIGNIFICANT CHANGE UP (ref 5–17)
APPEARANCE UR: CLEAR — SIGNIFICANT CHANGE UP
AST SERPL-CCNC: 12 U/L — SIGNIFICANT CHANGE UP (ref 10–40)
BASOPHILS # BLD AUTO: 0.04 K/UL — SIGNIFICANT CHANGE UP (ref 0–0.2)
BASOPHILS NFR BLD AUTO: 0.4 % — SIGNIFICANT CHANGE UP (ref 0–2)
BILIRUB SERPL-MCNC: 0.6 MG/DL — SIGNIFICANT CHANGE UP (ref 0.2–1.2)
BILIRUB UR-MCNC: NEGATIVE — SIGNIFICANT CHANGE UP
BUN SERPL-MCNC: 7 MG/DL — SIGNIFICANT CHANGE UP (ref 7–18)
CALCIUM SERPL-MCNC: 9.4 MG/DL — SIGNIFICANT CHANGE UP (ref 8.4–10.5)
CHLORIDE SERPL-SCNC: 108 MMOL/L — SIGNIFICANT CHANGE UP (ref 96–108)
CO2 SERPL-SCNC: 27 MMOL/L — SIGNIFICANT CHANGE UP (ref 22–31)
COLOR SPEC: YELLOW — SIGNIFICANT CHANGE UP
CREAT SERPL-MCNC: 0.6 MG/DL — SIGNIFICANT CHANGE UP (ref 0.5–1.3)
DIFF PNL FLD: NEGATIVE — SIGNIFICANT CHANGE UP
EGFR: 116 ML/MIN/1.73M2 — SIGNIFICANT CHANGE UP
EOSINOPHIL # BLD AUTO: 0.28 K/UL — SIGNIFICANT CHANGE UP (ref 0–0.5)
EOSINOPHIL NFR BLD AUTO: 3.1 % — SIGNIFICANT CHANGE UP (ref 0–6)
GLUCOSE SERPL-MCNC: 94 MG/DL — SIGNIFICANT CHANGE UP (ref 70–99)
GLUCOSE UR QL: NEGATIVE MG/DL — SIGNIFICANT CHANGE UP
HCT VFR BLD CALC: 44.9 % — SIGNIFICANT CHANGE UP (ref 34.5–45)
HGB BLD-MCNC: 14.3 G/DL — SIGNIFICANT CHANGE UP (ref 11.5–15.5)
IMM GRANULOCYTES NFR BLD AUTO: 0.4 % — SIGNIFICANT CHANGE UP (ref 0–0.9)
KETONES UR-MCNC: ABNORMAL MG/DL
LEUKOCYTE ESTERASE UR-ACNC: NEGATIVE — SIGNIFICANT CHANGE UP
LYMPHOCYTES # BLD AUTO: 1.86 K/UL — SIGNIFICANT CHANGE UP (ref 1–3.3)
LYMPHOCYTES # BLD AUTO: 20.4 % — SIGNIFICANT CHANGE UP (ref 13–44)
MAGNESIUM SERPL-MCNC: 2.2 MG/DL — SIGNIFICANT CHANGE UP (ref 1.6–2.6)
MCHC RBC-ENTMCNC: 27.3 PG — SIGNIFICANT CHANGE UP (ref 27–34)
MCHC RBC-ENTMCNC: 31.8 GM/DL — LOW (ref 32–36)
MCV RBC AUTO: 85.7 FL — SIGNIFICANT CHANGE UP (ref 80–100)
MONOCYTES # BLD AUTO: 0.64 K/UL — SIGNIFICANT CHANGE UP (ref 0–0.9)
MONOCYTES NFR BLD AUTO: 7 % — SIGNIFICANT CHANGE UP (ref 2–14)
NEUTROPHILS # BLD AUTO: 6.28 K/UL — SIGNIFICANT CHANGE UP (ref 1.8–7.4)
NEUTROPHILS NFR BLD AUTO: 68.7 % — SIGNIFICANT CHANGE UP (ref 43–77)
NITRITE UR-MCNC: NEGATIVE — SIGNIFICANT CHANGE UP
NRBC # BLD: 0 /100 WBCS — SIGNIFICANT CHANGE UP (ref 0–0)
PH UR: 6.5 — SIGNIFICANT CHANGE UP (ref 5–8)
PHOSPHATE SERPL-MCNC: 3.9 MG/DL — SIGNIFICANT CHANGE UP (ref 2.5–4.5)
PLATELET # BLD AUTO: 156 K/UL — SIGNIFICANT CHANGE UP (ref 150–400)
POTASSIUM SERPL-MCNC: 4.3 MMOL/L — SIGNIFICANT CHANGE UP (ref 3.5–5.3)
POTASSIUM SERPL-SCNC: 4.3 MMOL/L — SIGNIFICANT CHANGE UP (ref 3.5–5.3)
PROT SERPL-MCNC: 6.8 G/DL — SIGNIFICANT CHANGE UP (ref 6–8.3)
PROT UR-MCNC: NEGATIVE MG/DL — SIGNIFICANT CHANGE UP
RBC # BLD: 5.24 M/UL — HIGH (ref 3.8–5.2)
RBC # FLD: 12.4 % — SIGNIFICANT CHANGE UP (ref 10.3–14.5)
SODIUM SERPL-SCNC: 141 MMOL/L — SIGNIFICANT CHANGE UP (ref 135–145)
SP GR SPEC: 1.02 — SIGNIFICANT CHANGE UP (ref 1–1.03)
TROPONIN I, HIGH SENSITIVITY RESULT: 4.3 NG/L — SIGNIFICANT CHANGE UP
TSH SERPL-MCNC: 3.38 UU/ML — SIGNIFICANT CHANGE UP (ref 0.34–4.82)
UROBILINOGEN FLD QL: 1 MG/DL — SIGNIFICANT CHANGE UP (ref 0.2–1)
WBC # BLD: 9.14 K/UL — SIGNIFICANT CHANGE UP (ref 3.8–10.5)
WBC # FLD AUTO: 9.14 K/UL — SIGNIFICANT CHANGE UP (ref 3.8–10.5)

## 2024-02-27 PROCEDURE — 36415 COLL VENOUS BLD VENIPUNCTURE: CPT

## 2024-02-27 PROCEDURE — 99285 EMERGENCY DEPT VISIT HI MDM: CPT

## 2024-02-27 PROCEDURE — 99285 EMERGENCY DEPT VISIT HI MDM: CPT | Mod: 25

## 2024-02-27 PROCEDURE — 83735 ASSAY OF MAGNESIUM: CPT

## 2024-02-27 PROCEDURE — 84484 ASSAY OF TROPONIN QUANT: CPT

## 2024-02-27 PROCEDURE — 84100 ASSAY OF PHOSPHORUS: CPT

## 2024-02-27 PROCEDURE — 70544 MR ANGIOGRAPHY HEAD W/O DYE: CPT | Mod: 26,59

## 2024-02-27 PROCEDURE — 93005 ELECTROCARDIOGRAM TRACING: CPT

## 2024-02-27 PROCEDURE — 84443 ASSAY THYROID STIM HORMONE: CPT

## 2024-02-27 PROCEDURE — 85025 COMPLETE CBC W/AUTO DIFF WBC: CPT

## 2024-02-27 PROCEDURE — 70547 MR ANGIOGRAPHY NECK W/O DYE: CPT | Mod: 26

## 2024-02-27 PROCEDURE — 81003 URINALYSIS AUTO W/O SCOPE: CPT

## 2024-02-27 PROCEDURE — 70551 MRI BRAIN STEM W/O DYE: CPT | Mod: 26,MC

## 2024-02-27 PROCEDURE — 70551 MRI BRAIN STEM W/O DYE: CPT | Mod: MC

## 2024-02-27 PROCEDURE — 70547 MR ANGIOGRAPHY NECK W/O DYE: CPT

## 2024-02-27 PROCEDURE — 80053 COMPREHEN METABOLIC PANEL: CPT

## 2024-02-27 PROCEDURE — 70544 MR ANGIOGRAPHY HEAD W/O DYE: CPT

## 2024-02-27 NOTE — ED ADULT NURSE NOTE - CHIEF COMPLAINT QUOTE
Sending by Neurology for MRI, c/o vertigo with movements dizziness on and off x 8 days, d/x Meniere's,

## 2024-02-27 NOTE — ED PROVIDER NOTE - CLINICAL SUMMARY MEDICAL DECISION MAKING FREE TEXT BOX
43F p/w dizziness intermittent for ~1 week, atraumatic  -concern for cva vs cns mass vs metabolic derangement  -ecg, cbc, cmp, trop, mg, po4  -mri brain, mra head/neck

## 2024-02-27 NOTE — ED PROVIDER NOTE - OTHER RECORDS SUMMARY FREE TEXT FOR MDM OBTAINED AND REVIEWED OLD RECORDS QUESTION
The patient has a note from her neurologist advising her to get an MRI to r/o posterior circulation stroke

## 2024-02-27 NOTE — ED PROVIDER NOTE - PROGRESS NOTE DETAILS
the patient still has nystagmus but MRI has ruled out stroke and brain mass therefore likely peripheral vertigo. she declined my offer of meclizine prescription. will refer to ENT and refer back to her neurologist (Dr. Escalera)

## 2024-02-27 NOTE — ED ADULT TRIAGE NOTE - CHIEF COMPLAINT QUOTE
Sending by Neurology for MRI, pt c/o with vertigo with movements,  dizziness on and off x 8 days, Sending by Neurology for MRI, c/o vertigo with movements dizziness on and off x 8 days, d/x Meniere's,

## 2024-02-27 NOTE — ED PROVIDER NOTE - PATIENT PORTAL LINK FT
You can access the FollowMyHealth Patient Portal offered by [facility] by registering at the following website: [email or web address]. By joining [employer]’s FollowMyHealth portal, you will also be able to view your health information using other applications (apps) compatible with our system.

## 2024-02-27 NOTE — ED PROVIDER NOTE - NSFOLLOWUPINSTRUCTIONS_ED_ALL_ED_FT
Your MRI showed no evidence of stroke, mass, or problem with the circulation in your head and neck. Follow up with Dr. Escalera within 1 week. Also follow up with otolaryngology (Ear, nose and throat physician) within 2 weeks. Return to the ER for passing out or any other concerning symptoms.

## 2024-02-27 NOTE — ED PROVIDER NOTE - NSICDXPASTMEDICALHX_GEN_ALL_CORE_FT
Return emergency department develops new severe chest pain, shortness of breath, syncope or any other new concerning symptoms.  Otherwise please follow-up primary care physician for further evaluation treatment.  
PAST MEDICAL HISTORY:  Hypertension

## 2024-02-27 NOTE — ED PROVIDER NOTE - CARE PROVIDER_API CALL
David Escalera  Neurology  [address] [place] [id number]  Phone: [phone]  Fax: [phone]  Established Patient  Follow Up Time: 4-6 Days

## 2024-05-21 NOTE — ED PROVIDER NOTE - [facility] ED SYMPTOMS NEG
I do not usually prescribe risperidone, I think I did it as a courtesy fill 90 days ago.  Please contact patient and find out who routinely prescribes this, not sure if she sees psychiatry or if it is Dr. Pastor, help them schedule.  If they are going to run out before you see their specialist please let me know and I will send another 90 days.  Thank you   no vomiting

## 2024-06-27 NOTE — ED ADULT TRIAGE NOTE - SPO2 (%)
6/27/2024  9:24 AM      Pt LVM stating she wanted to let Dr. Acosta know that she was admitted in the hospital on 6/26/24.    Thank you,  Jeri Sykes    
100

## 2024-09-25 ENCOUNTER — NON-APPOINTMENT (OUTPATIENT)
Age: 44
End: 2024-09-25

## 2024-10-14 ENCOUNTER — APPOINTMENT (OUTPATIENT)
Age: 44
End: 2024-10-14

## 2025-02-05 NOTE — ED ADULT TRIAGE NOTE - STATUS:
Applied
decreased balance during turns/losing balance/decreased step length/decreased weight-shifting ability